# Patient Record
Sex: MALE | Race: BLACK OR AFRICAN AMERICAN | Employment: OTHER | URBAN - METROPOLITAN AREA
[De-identification: names, ages, dates, MRNs, and addresses within clinical notes are randomized per-mention and may not be internally consistent; named-entity substitution may affect disease eponyms.]

---

## 2019-06-18 ENCOUNTER — APPOINTMENT (OUTPATIENT)
Dept: GENERAL RADIOLOGY | Age: 59
DRG: 064 | End: 2019-06-18
Attending: EMERGENCY MEDICINE
Payer: OTHER GOVERNMENT

## 2019-06-18 ENCOUNTER — APPOINTMENT (OUTPATIENT)
Dept: CT IMAGING | Age: 59
DRG: 064 | End: 2019-06-18
Attending: EMERGENCY MEDICINE
Payer: OTHER GOVERNMENT

## 2019-06-18 ENCOUNTER — APPOINTMENT (OUTPATIENT)
Dept: GENERAL RADIOLOGY | Age: 59
DRG: 064 | End: 2019-06-18
Attending: HOSPITALIST
Payer: OTHER GOVERNMENT

## 2019-06-18 ENCOUNTER — APPOINTMENT (OUTPATIENT)
Dept: MRI IMAGING | Age: 59
DRG: 064 | End: 2019-06-18
Attending: HOSPITALIST
Payer: OTHER GOVERNMENT

## 2019-06-18 ENCOUNTER — HOSPITAL ENCOUNTER (INPATIENT)
Age: 59
LOS: 3 days | Discharge: HOME HEALTH | DRG: 064 | End: 2019-06-21
Attending: EMERGENCY MEDICINE | Admitting: HOSPITALIST
Payer: OTHER GOVERNMENT

## 2019-06-18 DIAGNOSIS — N17.9 AKI (ACUTE KIDNEY INJURY) (HCC): ICD-10-CM

## 2019-06-18 DIAGNOSIS — R41.0 CONFUSION: ICD-10-CM

## 2019-06-18 DIAGNOSIS — R41.0 DISORIENTATION: Primary | ICD-10-CM

## 2019-06-18 PROBLEM — G92.8 TOXIC METABOLIC ENCEPHALOPATHY: Status: ACTIVE | Noted: 2019-06-18

## 2019-06-18 PROBLEM — F33.9 RECURRENT MAJOR DEPRESSIVE DISORDER (HCC): Status: ACTIVE | Noted: 2019-06-18

## 2019-06-18 PROBLEM — R41.82 ALTERED MENTAL STATUS: Status: ACTIVE | Noted: 2019-06-18

## 2019-06-18 PROBLEM — F32.A DEPRESSION: Status: ACTIVE | Noted: 2019-06-18

## 2019-06-18 PROBLEM — I63.9 ACUTE CVA (CEREBROVASCULAR ACCIDENT) (HCC): Status: ACTIVE | Noted: 2019-06-18

## 2019-06-18 LAB
ALBUMIN SERPL-MCNC: 3.4 G/DL (ref 3.4–5)
ALBUMIN/GLOB SERPL: 0.8 {RATIO} (ref 0.8–1.7)
ALP SERPL-CCNC: 92 U/L (ref 45–117)
ALT SERPL-CCNC: 38 U/L (ref 16–61)
AMORPH CRY URNS QL MICRO: ABNORMAL
AMPHET UR QL SCN: NEGATIVE
ANION GAP SERPL CALC-SCNC: 7 MMOL/L (ref 3–18)
ANION GAP SERPL CALC-SCNC: 9 MMOL/L (ref 3–18)
APAP SERPL-MCNC: <2 UG/ML (ref 10–30)
APPEARANCE UR: ABNORMAL
AST SERPL-CCNC: 31 U/L (ref 15–37)
ATRIAL RATE: 84 BPM
BACTERIA URNS QL MICRO: ABNORMAL /HPF
BARBITURATES UR QL SCN: NEGATIVE
BASOPHILS # BLD: 0 K/UL (ref 0–0.1)
BASOPHILS NFR BLD: 0 % (ref 0–2)
BENZODIAZ UR QL: NEGATIVE
BILIRUB SERPL-MCNC: 0.3 MG/DL (ref 0.2–1)
BILIRUB UR QL: NEGATIVE
BNP SERPL-MCNC: 49 PG/ML (ref 0–900)
BUN SERPL-MCNC: 26 MG/DL (ref 7–18)
BUN SERPL-MCNC: 30 MG/DL (ref 7–18)
BUN/CREAT SERPL: 11 (ref 12–20)
BUN/CREAT SERPL: 13 (ref 12–20)
CALCIUM SERPL-MCNC: 8.4 MG/DL (ref 8.5–10.1)
CALCIUM SERPL-MCNC: 9 MG/DL (ref 8.5–10.1)
CALCULATED P AXIS, ECG09: 56 DEGREES
CALCULATED R AXIS, ECG10: 46 DEGREES
CALCULATED T AXIS, ECG11: 36 DEGREES
CANNABINOIDS UR QL SCN: POSITIVE
CHLORIDE SERPL-SCNC: 104 MMOL/L (ref 100–108)
CHLORIDE SERPL-SCNC: 108 MMOL/L (ref 100–108)
CO2 SERPL-SCNC: 23 MMOL/L (ref 21–32)
CO2 SERPL-SCNC: 25 MMOL/L (ref 21–32)
COCAINE UR QL SCN: NEGATIVE
COLOR UR: ABNORMAL
CREAT SERPL-MCNC: 1.94 MG/DL (ref 0.6–1.3)
CREAT SERPL-MCNC: 2.82 MG/DL (ref 0.6–1.3)
DIAGNOSIS, 93000: NORMAL
DIFFERENTIAL METHOD BLD: ABNORMAL
EOSINOPHIL # BLD: 0 K/UL (ref 0–0.4)
EOSINOPHIL NFR BLD: 0 % (ref 0–5)
EPITH CASTS URNS QL MICRO: ABNORMAL /LPF (ref 0–5)
ERYTHROCYTE [DISTWIDTH] IN BLOOD BY AUTOMATED COUNT: 15 % (ref 11.6–14.5)
ETHANOL SERPL-MCNC: <3 MG/DL (ref 0–3)
GLOBULIN SER CALC-MCNC: 4.4 G/DL (ref 2–4)
GLUCOSE BLD STRIP.AUTO-MCNC: 134 MG/DL (ref 70–110)
GLUCOSE SERPL-MCNC: 115 MG/DL (ref 74–99)
GLUCOSE SERPL-MCNC: 142 MG/DL (ref 74–99)
GLUCOSE UR STRIP.AUTO-MCNC: NEGATIVE MG/DL
HCT VFR BLD AUTO: 42.5 % (ref 36–48)
HDSCOM,HDSCOM: ABNORMAL
HGB BLD-MCNC: 14 G/DL (ref 13–16)
HGB UR QL STRIP: ABNORMAL
KETONES UR QL STRIP.AUTO: ABNORMAL MG/DL
LEUKOCYTE ESTERASE UR QL STRIP.AUTO: ABNORMAL
LYMPHOCYTES # BLD: 1.5 K/UL (ref 0.9–3.6)
LYMPHOCYTES NFR BLD: 12 % (ref 21–52)
MAGNESIUM SERPL-MCNC: 2.4 MG/DL (ref 1.6–2.6)
MCH RBC QN AUTO: 29.2 PG (ref 24–34)
MCHC RBC AUTO-ENTMCNC: 32.9 G/DL (ref 31–37)
MCV RBC AUTO: 88.7 FL (ref 74–97)
METHADONE UR QL: NEGATIVE
MONOCYTES # BLD: 1.2 K/UL (ref 0.05–1.2)
MONOCYTES NFR BLD: 10 % (ref 3–10)
MUCOUS THREADS URNS QL MICRO: ABNORMAL /LPF
NEUTS SEG # BLD: 9.6 K/UL (ref 1.8–8)
NEUTS SEG NFR BLD: 78 % (ref 40–73)
NITRITE UR QL STRIP.AUTO: NEGATIVE
OPIATES UR QL: NEGATIVE
P-R INTERVAL, ECG05: 140 MS
PCP UR QL: NEGATIVE
PH UR STRIP: 5 [PH] (ref 5–8)
PLATELET # BLD AUTO: 220 K/UL (ref 135–420)
PMV BLD AUTO: 9.1 FL (ref 9.2–11.8)
POTASSIUM SERPL-SCNC: 4.3 MMOL/L (ref 3.5–5.5)
POTASSIUM SERPL-SCNC: 4.6 MMOL/L (ref 3.5–5.5)
PROT SERPL-MCNC: 7.8 G/DL (ref 6.4–8.2)
PROT UR STRIP-MCNC: 300 MG/DL
Q-T INTERVAL, ECG07: 374 MS
QRS DURATION, ECG06: 96 MS
QTC CALCULATION (BEZET), ECG08: 441 MS
RBC # BLD AUTO: 4.79 M/UL (ref 4.7–5.5)
RBC #/AREA URNS HPF: ABNORMAL /HPF (ref 0–5)
SALICYLATES SERPL-MCNC: <1.7 MG/DL (ref 2.8–20)
SODIUM SERPL-SCNC: 136 MMOL/L (ref 136–145)
SODIUM SERPL-SCNC: 140 MMOL/L (ref 136–145)
SP GR UR REFRACTOMETRY: 1.02 (ref 1–1.03)
TROPONIN I BLD-MCNC: <0.04 NG/ML (ref 0–0.08)
UROBILINOGEN UR QL STRIP.AUTO: 1 EU/DL (ref 0.2–1)
VENTRICULAR RATE, ECG03: 84 BPM
WBC # BLD AUTO: 12.3 K/UL (ref 4.6–13.2)
WBC URNS QL MICRO: ABNORMAL /HPF (ref 0–5)

## 2019-06-18 PROCEDURE — 87086 URINE CULTURE/COLONY COUNT: CPT

## 2019-06-18 PROCEDURE — 81001 URINALYSIS AUTO W/SCOPE: CPT

## 2019-06-18 PROCEDURE — 80053 COMPREHEN METABOLIC PANEL: CPT

## 2019-06-18 PROCEDURE — 83880 ASSAY OF NATRIURETIC PEPTIDE: CPT

## 2019-06-18 PROCEDURE — 74011000258 HC RX REV CODE- 258: Performed by: HOSPITALIST

## 2019-06-18 PROCEDURE — 93005 ELECTROCARDIOGRAM TRACING: CPT

## 2019-06-18 PROCEDURE — 74011000250 HC RX REV CODE- 250: Performed by: HOSPITALIST

## 2019-06-18 PROCEDURE — 70551 MRI BRAIN STEM W/O DYE: CPT

## 2019-06-18 PROCEDURE — 99285 EMERGENCY DEPT VISIT HI MDM: CPT

## 2019-06-18 PROCEDURE — 74011250636 HC RX REV CODE- 250/636: Performed by: EMERGENCY MEDICINE

## 2019-06-18 PROCEDURE — 36415 COLL VENOUS BLD VENIPUNCTURE: CPT

## 2019-06-18 PROCEDURE — 74018 RADEX ABDOMEN 1 VIEW: CPT

## 2019-06-18 PROCEDURE — 51798 US URINE CAPACITY MEASURE: CPT

## 2019-06-18 PROCEDURE — 51701 INSERT BLADDER CATHETER: CPT

## 2019-06-18 PROCEDURE — 82962 GLUCOSE BLOOD TEST: CPT

## 2019-06-18 PROCEDURE — 80307 DRUG TEST PRSMV CHEM ANLYZR: CPT

## 2019-06-18 PROCEDURE — 83735 ASSAY OF MAGNESIUM: CPT

## 2019-06-18 PROCEDURE — 96374 THER/PROPH/DIAG INJ IV PUSH: CPT

## 2019-06-18 PROCEDURE — 65660000000 HC RM CCU STEPDOWN

## 2019-06-18 PROCEDURE — 71045 X-RAY EXAM CHEST 1 VIEW: CPT

## 2019-06-18 PROCEDURE — 85025 COMPLETE CBC W/AUTO DIFF WBC: CPT

## 2019-06-18 PROCEDURE — 84484 ASSAY OF TROPONIN QUANT: CPT

## 2019-06-18 PROCEDURE — 96361 HYDRATE IV INFUSION ADD-ON: CPT

## 2019-06-18 PROCEDURE — 77030005563 HC CATH URETH INT MMGH -A

## 2019-06-18 PROCEDURE — 70544 MR ANGIOGRAPHY HEAD W/O DYE: CPT

## 2019-06-18 PROCEDURE — 70450 CT HEAD/BRAIN W/O DYE: CPT

## 2019-06-18 PROCEDURE — 74011250636 HC RX REV CODE- 250/636: Performed by: HOSPITALIST

## 2019-06-18 RX ORDER — ASPIRIN 325 MG
325 TABLET ORAL DAILY
Status: DISCONTINUED | OUTPATIENT
Start: 2019-06-18 | End: 2019-06-19

## 2019-06-18 RX ORDER — GABAPENTIN 100 MG/1
400 CAPSULE ORAL 4 TIMES DAILY
Status: ON HOLD | COMMUNITY
End: 2019-06-21 | Stop reason: SDUPTHER

## 2019-06-18 RX ORDER — DULOXETIN HYDROCHLORIDE 20 MG/1
30 CAPSULE, DELAYED RELEASE ORAL 3 TIMES DAILY
COMMUNITY

## 2019-06-18 RX ORDER — SODIUM CHLORIDE AND POTASSIUM CHLORIDE .9; .15 G/100ML; G/100ML
SOLUTION INTRAVENOUS CONTINUOUS
Status: DISCONTINUED | OUTPATIENT
Start: 2019-06-18 | End: 2019-06-20

## 2019-06-18 RX ORDER — QUETIAPINE FUMARATE 100 MG/1
400 TABLET, FILM COATED ORAL
COMMUNITY

## 2019-06-18 RX ORDER — NALOXONE HYDROCHLORIDE 1 MG/ML
1 INJECTION INTRAMUSCULAR; INTRAVENOUS; SUBCUTANEOUS
Status: COMPLETED | OUTPATIENT
Start: 2019-06-18 | End: 2019-06-18

## 2019-06-18 RX ORDER — SODIUM CHLORIDE 0.9 % (FLUSH) 0.9 %
5-40 SYRINGE (ML) INJECTION EVERY 8 HOURS
Status: DISCONTINUED | OUTPATIENT
Start: 2019-06-18 | End: 2019-06-21 | Stop reason: HOSPADM

## 2019-06-18 RX ORDER — SODIUM CHLORIDE 0.9 % (FLUSH) 0.9 %
5-40 SYRINGE (ML) INJECTION AS NEEDED
Status: DISCONTINUED | OUTPATIENT
Start: 2019-06-18 | End: 2019-06-21 | Stop reason: HOSPADM

## 2019-06-18 RX ORDER — ASPIRIN 325 MG
TABLET ORAL
Status: DISPENSED
Start: 2019-06-18 | End: 2019-06-19

## 2019-06-18 RX ORDER — HEPARIN SODIUM 5000 [USP'U]/ML
5000 INJECTION, SOLUTION INTRAVENOUS; SUBCUTANEOUS EVERY 8 HOURS
Status: DISCONTINUED | OUTPATIENT
Start: 2019-06-18 | End: 2019-06-21 | Stop reason: HOSPADM

## 2019-06-18 RX ADMIN — HEPARIN SODIUM 5000 UNITS: 5000 INJECTION INTRAVENOUS; SUBCUTANEOUS at 18:22

## 2019-06-18 RX ADMIN — DOXYCYCLINE 100 MG: 100 INJECTION, POWDER, LYOPHILIZED, FOR SOLUTION INTRAVENOUS at 11:05

## 2019-06-18 RX ADMIN — NALOXONE HYDROCHLORIDE 1 MG: 1 INJECTION PARENTERAL at 04:53

## 2019-06-18 RX ADMIN — FOLIC ACID: 5 INJECTION, SOLUTION INTRAMUSCULAR; INTRAVENOUS; SUBCUTANEOUS at 12:27

## 2019-06-18 RX ADMIN — SODIUM CHLORIDE 1000 ML: 900 INJECTION, SOLUTION INTRAVENOUS at 07:23

## 2019-06-18 RX ADMIN — SODIUM CHLORIDE AND POTASSIUM CHLORIDE 125 ML/HR: 9; 1.49 INJECTION, SOLUTION INTRAVENOUS at 07:53

## 2019-06-18 RX ADMIN — SODIUM CHLORIDE 500 ML: 900 INJECTION, SOLUTION INTRAVENOUS at 05:34

## 2019-06-18 RX ADMIN — HEPARIN SODIUM 5000 UNITS: 5000 INJECTION INTRAVENOUS; SUBCUTANEOUS at 11:16

## 2019-06-18 RX ADMIN — CEFTRIAXONE 1 G: 1 INJECTION, POWDER, FOR SOLUTION INTRAMUSCULAR; INTRAVENOUS at 11:05

## 2019-06-18 NOTE — PROGRESS NOTES
Problem: Falls - Risk of  Goal: *Absence of Falls  Description  Document Jennifer Sol Fall Risk and appropriate interventions in the flowsheet.   Outcome: Progressing Towards Goal     Problem: Patient Education: Go to Patient Education Activity  Goal: Patient/Family Education  Outcome: Progressing Towards Goal

## 2019-06-18 NOTE — PROGRESS NOTES
Hospitalist Progress Note    Patient: Gisell Mcarthur MRN: [id number]  CSN: [id number]    YOB: 1960  Age: 66 y.o. Sex: male    DOA: 6/18/2019 LOS:  LOS: 0 days        Met with family earlier this afternoon, and Mr Lety Bain  He noted left hand grasp weakness and noted it was not normal for him  Otherwise he was now awake, alert, oriented and feeling well-looking much better than earlier in ER    MRI brain ordered and performed-called by radiologist to note there are bilateral embolic appearing infarctions in the brain, largest of which in cerebellum    Consulted with neurology-ordered aspirin, he has declined it as \"allergic\", he has \"rash with zocor\", so hesitant on statin administration.  I ordered stroke protocol and STAT MRA head to see if basilar artery occlusion-if this is positive  he will need transfer to [facility]    Discussed all results with family    Patient notes he has been without his CPAP for 5 months as ex-wife has held his property and they are in court trying to work out things-he was told he could become very ill if he did not have his CPAP apparently per his PCP    Have ordered CPAP for him here for PRN and QHS

## 2019-06-18 NOTE — ED NOTES
Pt unable to provide urine sample, attempt to cath pt for urine unsuccessful, pt to attempt to provide urine sample again. Pt resting on stretcher, call bell in reach and bed in low position. Son at bedside. Will continue to monitor.

## 2019-06-18 NOTE — ROUTINE PROCESS
TRANSFER - IN REPORT: 
 
Verbal report received from CLARI Bolanos R.N.(name) on NAWAF Pfeiffer  being received from ED(unit) for routine progression of care Report consisted of patients Situation, Background, Assessment and  
Recommendations(SBAR). Information from the following report(s) SBAR, Kardex, Intake/Output, MAR, Accordion, Recent Results and Med Rec Status was reviewed with the receiving nurse. Opportunity for questions and clarification was provided. Assessment completed upon patients arrival to unit and care assumed.

## 2019-06-18 NOTE — ED PROVIDER NOTES
EMERGENCY DEPARTMENT HISTORY AND PHYSICAL EXAM    Date: 6/18/2019  Patient Name: Jann Flores    History of Presenting Illness     Chief Complaint   Patient presents with    Hypotension         History Provided By: Patient and EMS    Additional History (Context):   Jann Flores is a 66 y.o. male with PMHX hypertension, diabetes presents to the emergency department via EMS after calling 911 stating that \"I cannot move\". ,  Patient denying any states that he only took his gabapentin and high blood pressure medication as prescribed. Patient denies any alcohol or drug use. EMS reports that the patient had a episode of bowel incontinence. Pt denies abdominal pain, vomiting, fever, chills, shortness of breath, and any other sxs or complaints. Denies any intentional overdose. Denies any SI or HI.    PCP: Memo Mcgraw MD    Current Facility-Administered Medications   Medication Dose Route Frequency Provider Last Rate Last Dose    sodium chloride 0.9 % bolus infusion 1,000 mL  1,000 mL IntraVENous ONCE Mehdi Govea, DO 1,000 mL/hr at 06/18/19 0723 1,000 mL at 06/18/19 0723    0.9% sodium chloride with KCl 20 mEq/L infusion   IntraVENous CONTINUOUS Ruddy Lee MD        0.9% sodium chloride 1,000 mL with mvi, adult no. 4 with vit K 10 mL, thiamine 548 mg, folic acid 1 mg infusion   IntraVENous Q24H Ruddy Lee MD        heparin (porcine) injection 5,000 Units  5,000 Units SubCUTAneous Q8H Ruddy Lee MD         Current Outpatient Medications   Medication Sig Dispense Refill    gabapentin (NEURONTIN) 100 mg capsule Take  by mouth three (3) times daily.  Cetirizine (ZYRTEC) 10 mg cap Take  by mouth.  QUEtiapine (SEROQUEL) 100 mg tablet Take 50 mg by mouth two (2) times a day.  DULoxetine (CYMBALTA) 20 mg capsule Take 20 mg by mouth daily.          Past History     Past Medical History:  Past Medical History:   Diagnosis Date    DJD (degenerative joint disease)     Hypertension     Neuropathy        Past Surgical History:  History reviewed. No pertinent surgical history. Family History:  History reviewed. No pertinent family history. Social History:  Social History     Tobacco Use    Smoking status: Never Smoker    Smokeless tobacco: Never Used   Substance Use Topics    Alcohol use: Not on file    Drug use: Never       Allergies: Allergies   Allergen Reactions    Simvastatin Rash         Review of Systems   Review of Systems   Constitutional: Negative for chills and fever. HENT: Negative for congestion, ear pain, sinus pain and sore throat. Eyes: Negative for pain and visual disturbance. Respiratory: Negative for cough and shortness of breath. Cardiovascular: Negative for chest pain and leg swelling. Gastrointestinal: Negative for abdominal pain, constipation, diarrhea, nausea and vomiting. Genitourinary: Negative for dysuria and hematuria. Musculoskeletal: Negative for back pain and neck pain. Skin: Negative for pallor and rash. Neurological: Negative for dizziness, tremors, weakness, light-headedness and headaches. All other systems reviewed and are negative.       Physical Exam     Vitals:    06/18/19 0459 06/18/19 0500 06/18/19 0508 06/18/19 0600   BP:  (!) 121/98  104/52   Pulse:  87  85   Resp:  17  16   Temp:       SpO2: 96%  96% 98%   Weight:       Height:         Physical Exam    Nursing note and vitals reviewed    Constitutional: Middle-aged -American male, no acute distress  Head: Normocephalic, Atraumatic  Eyes: Pinpoint pupils, EOMI  Neck: Supple, non-tender  Cardiovascular: Regular rate and rhythm, no murmurs, rubs, or gallops  Chest: Normal work of breathing and chest excursion bilaterally  Lungs: Clear to ausculation bilaterally, no wheezes, no rhonchi  Abdomen: Soft, non tender, non distended, normoactive bowel sounds  Back: No evidence of trauma or deformity  Extremities: No evidence of trauma or deformity, no LE edema  Skin: Warm and dry, normal cap refill  Neuro: Alert and appropriate, CN intact, normal speech, moving all 4 extremities freely and symmetrically  Psychiatric: Normal mood and affect       Diagnostic Study Results     Labs -     Recent Results (from the past 12 hour(s))   CBC WITH AUTOMATED DIFF    Collection Time: 06/18/19  4:50 AM   Result Value Ref Range    WBC 12.3 4.6 - 13.2 K/uL    RBC 4.79 4.70 - 5.50 M/uL    HGB 14.0 13.0 - 16.0 g/dL    HCT 42.5 36.0 - 48.0 %    MCV 88.7 74.0 - 97.0 FL    MCH 29.2 24.0 - 34.0 PG    MCHC 32.9 31.0 - 37.0 g/dL    RDW 15.0 (H) 11.6 - 14.5 %    PLATELET 541 083 - 264 K/uL    MPV 9.1 (L) 9.2 - 11.8 FL    NEUTROPHILS 78 (H) 40 - 73 %    LYMPHOCYTES 12 (L) 21 - 52 %    MONOCYTES 10 3 - 10 %    EOSINOPHILS 0 0 - 5 %    BASOPHILS 0 0 - 2 %    ABS. NEUTROPHILS 9.6 (H) 1.8 - 8.0 K/UL    ABS. LYMPHOCYTES 1.5 0.9 - 3.6 K/UL    ABS. MONOCYTES 1.2 0.05 - 1.2 K/UL    ABS. EOSINOPHILS 0.0 0.0 - 0.4 K/UL    ABS. BASOPHILS 0.0 0.0 - 0.1 K/UL    DF AUTOMATED     METABOLIC PANEL, COMPREHENSIVE    Collection Time: 06/18/19  4:50 AM   Result Value Ref Range    Sodium 136 136 - 145 mmol/L    Potassium 4.6 3.5 - 5.5 mmol/L    Chloride 104 100 - 108 mmol/L    CO2 25 21 - 32 mmol/L    Anion gap 7 3.0 - 18 mmol/L    Glucose 142 (H) 74 - 99 mg/dL    BUN 30 (H) 7.0 - 18 MG/DL    Creatinine 2.82 (H) 0.6 - 1.3 MG/DL    BUN/Creatinine ratio 11 (L) 12 - 20      GFR est AA 28 (L) >60 ml/min/1.73m2    GFR est non-AA 23 (L) >60 ml/min/1.73m2    Calcium 9.0 8.5 - 10.1 MG/DL    Bilirubin, total 0.3 0.2 - 1.0 MG/DL    ALT (SGPT) 38 16 - 61 U/L    AST (SGOT) 31 15 - 37 U/L    Alk.  phosphatase 92 45 - 117 U/L    Protein, total 7.8 6.4 - 8.2 g/dL    Albumin 3.4 3.4 - 5.0 g/dL    Globulin 4.4 (H) 2.0 - 4.0 g/dL    A-G Ratio 0.8 0.8 - 1.7     NT-PRO BNP    Collection Time: 06/18/19  4:50 AM   Result Value Ref Range    NT pro-BNP 49 0 - 900 PG/ML   MAGNESIUM    Collection Time: 06/18/19  4:50 AM   Result Value Ref Range    Magnesium 2.4 1.6 - 2.6 mg/dL   SALICYLATE    Collection Time: 06/18/19  4:50 AM   Result Value Ref Range    Salicylate level <4.1 (L) 2.8 - 20.0 MG/DL   ACETAMINOPHEN    Collection Time: 06/18/19  4:50 AM   Result Value Ref Range    Acetaminophen level <2 (L) 10.0 - 30.0 ug/mL   ETHYL ALCOHOL    Collection Time: 06/18/19  4:50 AM   Result Value Ref Range    ALCOHOL(ETHYL),SERUM <3 0 - 3 MG/DL   GLUCOSE, POC    Collection Time: 06/18/19  4:55 AM   Result Value Ref Range    Glucose (POC) 134 (H) 70 - 110 mg/dL   EKG, 12 LEAD, INITIAL    Collection Time: 06/18/19  4:59 AM   Result Value Ref Range    Ventricular Rate 84 BPM    Atrial Rate 84 BPM    P-R Interval 140 ms    QRS Duration 96 ms    Q-T Interval 374 ms    QTC Calculation (Bezet) 441 ms    Calculated P Axis 56 degrees    Calculated R Axis 46 degrees    Calculated T Axis 36 degrees    Diagnosis       Normal sinus rhythm  Normal ECG  No previous ECGs available     POC TROPONIN-I    Collection Time: 06/18/19  6:07 AM   Result Value Ref Range    Troponin-I (POC) <0.04 0.00 - 0.08 ng/mL       Radiologic Studies -   CT HEAD WO CONT   Final Result   IMPRESSION:      1. No acute intracranial abnormalities are identified. No CT evidence to   suggest acute intracranial hematoma, cortical infarct, or mass effect/mass   lesion. 2. Paranasal sinus inflammatory changes. XR CHEST PORT    (Results Pending)   RADIOLOGY FINDINGS  Chest x-ray shows no acute process  Pending review by Radiologist  Recorded by Griselda Govea DO      CT Results  (Last 48 hours)               06/18/19 0643  CT HEAD WO CONT Final result    Impression:  IMPRESSION:       1. No acute intracranial abnormalities are identified. No CT evidence to   suggest acute intracranial hematoma, cortical infarct, or mass effect/mass   lesion. 2. Paranasal sinus inflammatory changes.            Narrative:  EXAM: CT head       CLINICAL INDICATION/HISTORY: Altered level of consciousness, confusion. COMPARISON: None. TECHNIQUE: Axial CT imaging of the head  was obtained from skull base to vertex   without intravenous contrast. Coronal and sagittal reconstructions were   obtained. One or more dose reduction techniques were used on this CT: automated   exposure control, adjustment of the mAs and/or kVp according to patient's size,   and iterative reconstruction techniques. The specific techniques utilized on   this CT exam have been documented in the patient's electronic medical record. Digital imaging and communications and medicine (DICOM) format image data are   available to nonaffiliated external healthcare facilities or entities on a   secure, media free, reciprocally searchable basis with patient authorization for   at least a 12 month period after this study. _______________       FINDINGS:       BRAIN AND POSTERIOR FOSSA: The sulci, folia, ventricles and basal cisterns are   within normal limits for the patient?s age. There is no intracranial hemorrhage,   mass effect, or shift of midline structures. There are no areas of abnormal   parenchymal attenuation. EXTRA-AXIAL SPACES AND MENINGES: There are no abnormal extra-axial fluid   collections. CALVARIUM: No acute osseous abnormality. SINUSES: Paranasal sinus partial opacification and mucosal thickening. Mastoids   clear. OTHER: None.       _______________               CXR Results  (Last 48 hours)    None            Medical Decision Making   I am the first provider for this patient. I reviewed the vital signs, available nursing notes, past medical history, past surgical history, family history and social history. Vital Signs-Reviewed the patient's vital signs. Pulse Oximetry Analysis -96 % on room air    Cardiac Monitor:  Rate: 87 bpm  Rhythm: Regular    EKG interpretation: (Preliminary)  5:05 AM  Normal sinus rhythm 84 bpm.  QTc 441 ms.   No acute ST elevation    Records Reviewed: Nursing Notes and Old Medical Records    Provider Notes:   66 y.o. male with a history of hypertension presenting after calling 911 saying \"I cannot move\". On exam patient is afebrile with appropriate vital signs. Pinpoint pupils. Moving all 4 extremities symmetrically and freely. No focal neurological deficits. One history of bowel incontinence. Work-up will be broad and evaluate for metabolic derangements. Will also obtain UDS, check salicylate, Tylenol alcohol level. Although ACS very low on my differential, will obtain EKG and cardiac enzymes. Also obtain a CT scan of the head. Will trial Narcan and reassess. Procedures:  Procedures    ED Course:   4:46 AM   Initial assessment performed. The patients presenting problems have been discussed, and they are in agreement with the care plan formulated and outlined with them. I have encouraged them to ask questions as they arise throughout their visit. 7:12 AM  Patient's labs significant with an elevated BUN and creatinine of 2.8. Creatinine in 2018 within normal limits of 1. Attempted to straight cath the patient for urinalysis twice, however unable to obtain urine. UDS still pending from urine. Patient's lack of urine, elevated creatinine, concern for RODRIGUEZ. CT scan showing no acute process. Diagnosis and Disposition     7:12 AM  I have spent 40 minutes of critical care time involved in lab review, consultations with specialist, family decision-making, and documentation. During this entire length of time I was immediately available to the patient. Critical Care: The reason for providing this level of medical care for this critically ill patient was due a critical illness that impaired one or more vital organ systems such that there was a high probability of imminent or life threatening deterioration in the patients condition.  This care involved high complexity decision making to assess, manipulate, and support vital system functions, to treat this degreee vital organ system failure and to prevent further life threatening deterioration of the patients condition. Core Measures:  For Hospitalized Patients:    1. Hospitalization Decision Time:  The decision to hospitalize the patient was made by Eugenia Castrejon DO at 7:28 AM on 6/18/2019    2. Aspirin: Aspirin was not given because the patient did not present with a stroke at the time of their Emergency Department evaluation    7:12 AM  Patient is being admitted to the hospital by Tree Rubio MD. The results of their tests and reasons for their admission have been discussed with them and/or available family. They convey agreement and understanding for the need to be admitted and for their admission diagnosis. CONDITIONS ON ADMISSION:  Sepsis is not present at the time of admission. Pneumonia is not present at the time of admission. MRSA is not present at the time of admission. Wound infection is not present at the time of admission. Pressure Ulcer is not present at the time of admission. CLINICAL IMPRESSION:    1. Disorientation    2. Confusion    3. [facility]UEZ (acute kidney injury) ([facility]      ____________________________________     Please note that this dictation was completed with [employer] the computer voice recognition software. Quite often unanticipated grammatical, syntax, homophones, and other interpretive errors are inadvertently transcribed by the computer software. Please disregard these errors. Please excuse any errors that have escaped final proofreading.

## 2019-06-18 NOTE — H&P
[facility] [place]  HISTORY AND PHYSICAL    Name:  Liana Perrin  MR#:   [id number]  :  1960  ACCOUNT #:  [de-identified]  ADMIT DATE:  2019      ADMITTING DIAGNOSES:  1. Metabolic toxic encephalopathy. 2.  Depression. 3.  Chronic insomnia. 4.  Urinary tract infection. 5.  Acute kidney injury. HISTORY OF PRESENT ILLNESS:  This is a 56-year-old [place] American gentleman who was found on the couch by his son this morning stating he could not move. He had had stool incontinence. He was very encephalopathic and confused. EMS was called. They brought him into the emergency room where he denied any intentional overdose. Was very lethargic initially and unable to explain what had actually happened to him, but as he received some fluids and started to wake up more, he noted that he had taken 1200 mg of gabapentin yesterday which I believe is his usual dose and also took Seroquel. He states he was trying to go to sleep last night as he suffers with chronic insomnia and had taken his medication together. I am not sure at this point if that is a regular occurrence for him or not, but anyways he denies any alcohol or illicit drugs. We have been unable to obtain a UDS because of no urine in his bladder at this point; we are awaiting for him to urinate. He denies any suicidal or homicidal intention. I do not know his PCP just yet. He still awakening and able to answer most questions but not all. CURRENT OUTPATIENT MEDICATIONS:  1.  Neurontin. 2.  Zyrtec. 3.  Seroquel. 4.  Cymbalta. PAST MEDICAL HISTORY:  1. Depression. 2.  Degenerative joint disease. 3.  Neuropathy. 4.  High blood pressure. PAST SURGICAL HISTORY:  None. FAMILY HISTORY:  He cannot tell me right now. SOCIAL HISTORY:  Does not smoke cigarettes. Does not drink alcohol regularly. Denies illicit drugs. Lives with his son. ALLERGIES:  HAS AN ALLERGY TO SIMVASTATIN.     REVIEW OF SYSTEMS:  CONSTITUTIONAL: He is denying any chills or fevers. RESPIRATORY:  He denies coughing, shortness of breath, or upper respiratory infection symptoms. CARDIOVASCULAR:  No chest pain or leg swelling. GI:  He denies any nausea or vomiting. He has had no recent diarrhea. Also he had stool incontinence this morning. GENITOURINARY:  No dysuria or hematuria recently. No abdominal pain. NEUROLOGIC:  He denies any current dizziness. He feels very weak and tired currently. No headaches. PHYSICAL EXAMINATION:  GENERAL:  He is lethargic but he will arouse and he answers questions. He was able to sit up in the bed without me telling him to do so. He is oriented to person, place and time currently. VITAL SIGNS:  Blood pressure is 132/90, pulse 81, temperature 97.1, respiratory rate 17, SaO2 is 97% on room air. HEENT:  Sclerae anicteric. Conjunctivae pale. Oropharynx is dry. No lesions. NECK:  Supple. No thyromegaly or lymphadenopathy. LUNGS:  Clear bilaterally. No rales, rhonchi, or wheezes. CARDIAC:  Regular rate and rhythm. No murmur, rub, or gallop. ABDOMEN:  Soft, nontender, no distention. No ascites. Normal bowel sounds. LOWER EXTREMITIES:  No clubbing, cyanosis, or edema. NEUROLOGIC:  Moves all four extremities with purpose. No asterixis noted. No cranial nerve deficit noted on exam.    LABORATORY DATA:  Labs show a troponin that is normal.  Glucose 134. Urinalysis was positive for moderate blood, small leukocyte esterase, 15-20 wbc's, 2+ bacteria. Metabolic panel showed BUN of 30, creatinine 2.82. LFTs were unremarkable. BNP was normal at 49. Acetaminophen level less than 2. Salicylate level less than 1.7. They did finally obtain a UDS, it is positive for THC. Methadone is negative. CT of the head showed no acute intracranial abnormalities, and he had an EKG that showed normal sinus rhythm. Chest x-ray shows no acute cardiopulmonary process.   White count is 12.3, H and H 14 and 42.5, platelets of 220.    ASSESSMENT:  1. Toxic metabolic encephalopathy, likely due to medications, dehydration, and acute kidney injury. I do not know his baseline. Creatinine also if I look at labs from 09/2018, his creatinine then was 1.1.  2.  Urinary tract infection. 3.  Depression and insomnia. PLAN:  Continue hydration. Start empiric antibiotics. Follow up a urine culture result; wants to get more urine from him. Monitor him on telemetry. Hopefully he will wake up more so. We are going to give him a banana bag in the interim as well. Heparin for DVT prophylaxis. Follow up a repeat BMP, CBC tomorrow morning. Check a B12 and folate. Hold all his home medications at this point in time and expect at least 24-48 hours in the hospital for these acute issues.       MD TWYLA [email or web address]  D:  06/18/2019 11:04  T:  06/18/2019 11:09  JOB #:  [id number]

## 2019-06-18 NOTE — PROGRESS NOTES
RADIOLOGY PROGRESS NOTE    Preliminary interpretation MRI brain 6/18/19:   > Multiple bilateral acute infarcts in the distribution of bilateral posterior circulation and bilateral MCA. Largest infarct in the posterior left cerebellum. Findings consistent with embolic infarcts.   > No hemorrhage. Final interpretation by neuroradiologist in am.    D/W Dr. Pete Smith.     Altaf Valadez MD  Vascular & Interventional Radiology  [facility]  6/18/2019

## 2019-06-18 NOTE — ED NOTES
Pt attempted to provide urine sample, unable to at this time.  Pt and family requesting for pt to try occasionally rather than attempt straight cath again, provider aware

## 2019-06-18 NOTE — ED NOTES
TRANSFER - OUT REPORT:    Verbal report given to Gabriella Heart RN on Gricelda Aceves  being transferred to [facility] for routine progression of care       Report consisted of patients Situation, Background, Assessment and   Recommendations(SBAR). Information from the following report(s) SBAR and Cardiac Rhythm Normal Sinus Rythmn  was reviewed with the receiving nurse. Lines:   Peripheral IV 06/18/19 Left Antecubital (Active)   Site Assessment Clean, dry, & intact 6/18/2019  4:52 AM   Phlebitis Assessment 0 6/18/2019  4:52 AM   Infiltration Assessment 0 6/18/2019  4:52 AM   Dressing Status Clean, dry, & intact 6/18/2019  4:52 AM   Dressing Type Transparent 6/18/2019  4:52 AM   Hub Color/Line Status Green 6/18/2019  4:52 AM   Action Taken Blood drawn 6/18/2019  4:52 AM       Saline Lock 06/18/19 Right Antecubital (Active)   Site Assessment Clean, dry, & intact 6/18/2019  4:50 AM   Phlebitis Assessment 0 6/18/2019  4:50 AM   Infiltration Assessment 0 6/18/2019  4:50 AM   Dressing Status Clean, dry, & intact 6/18/2019  4:50 AM   Dressing Type Transparent 6/18/2019  4:50 AM   Hub Color/Line Status Pink;Flushed;Patent 6/18/2019  4:50 AM   Action Taken Blood drawn 6/18/2019  4:50 AM        Opportunity for questions and clarification was provided.       Patient transported with:   Monitor  Registered Nurse

## 2019-06-18 NOTE — PROGRESS NOTES
Called floor for patient at 06:55 pm called floor again at 07:18 still waiting on patient  For Stat  MRA .

## 2019-06-18 NOTE — ED TRIAGE NOTES
Pt arrives via ems stretcher with c\o incontinence of bowel, per EMS pt was hypotensive on arrival, pt with pinpoint pupils and pt is drowsy but arousable to loud verbal stimuli, pt is able to make needs known speaking in complete sentences, pt in nad at this time

## 2019-06-18 NOTE — ED NOTES
Verbal shift change report given to Rachid Foster RN (oncoming nurse) by Marla Dougherty RN (offgoing nurse). Report included the following information SBAR, ED Summary and MAR.

## 2019-06-18 NOTE — PROGRESS NOTES
0900:Patient care received. Patient drowsy and oriented x 4 . Patient resting in bed. Patient stable. Call light with in reach bed in lowest position.

## 2019-06-19 ENCOUNTER — APPOINTMENT (OUTPATIENT)
Dept: NON INVASIVE DIAGNOSTICS | Age: 59
DRG: 064 | End: 2019-06-19
Attending: HOSPITALIST
Payer: OTHER GOVERNMENT

## 2019-06-19 ENCOUNTER — APPOINTMENT (OUTPATIENT)
Dept: VASCULAR SURGERY | Age: 59
DRG: 064 | End: 2019-06-19
Attending: HOSPITALIST
Payer: OTHER GOVERNMENT

## 2019-06-19 LAB
ANION GAP SERPL CALC-SCNC: 7 MMOL/L (ref 3–18)
BUN SERPL-MCNC: 22 MG/DL (ref 7–18)
BUN/CREAT SERPL: 15 (ref 12–20)
CALCIUM SERPL-MCNC: 8.1 MG/DL (ref 8.5–10.1)
CHLORIDE SERPL-SCNC: 111 MMOL/L (ref 100–108)
CHOLEST SERPL-MCNC: 212 MG/DL
CO2 SERPL-SCNC: 24 MMOL/L (ref 21–32)
CREAT SERPL-MCNC: 1.47 MG/DL (ref 0.6–1.3)
ECHO AO ARCH DIAM: 3.1 CM
ECHO AO ASC DIAM: 3.63 CM
ECHO AO ROOT DIAM: 3.06 CM
ECHO AV AREA PEAK VELOCITY: 2.7 CM2
ECHO AV AREA VTI: 3.1 CM2
ECHO AV AREA/BSA PEAK VELOCITY: 1.2 CM2/M2
ECHO AV AREA/BSA VTI: 1.4 CM2/M2
ECHO AV MEAN GRADIENT: 4.1 MMHG
ECHO AV PEAK GRADIENT: 7.4 MMHG
ECHO AV PEAK VELOCITY: 135.74 CM/S
ECHO AV VTI: 28.27 CM
ECHO IVC PROX: 1.2 CM
ECHO LA MAJOR AXIS: 4.78 CM
ECHO LA VOL 2C: 42.34 ML (ref 18–58)
ECHO LA VOL 4C: 48.02 ML (ref 18–58)
ECHO LA VOL BP: 51.24 ML (ref 18–58)
ECHO LA VOL/BSA BIPLANE: 23.76 ML/M2 (ref 16–28)
ECHO LA VOLUME INDEX A2C: 19.63 ML/M2 (ref 16–28)
ECHO LA VOLUME INDEX A4C: 22.26 ML/M2 (ref 16–28)
ECHO LV E' LATERAL VELOCITY: 10 CM/S
ECHO LV E' SEPTAL VELOCITY: 7 CM/S
ECHO LV EDV A2C: 71.1 ML
ECHO LV EDV A4C: 76.4 ML
ECHO LV EDV BP: 74.7 ML (ref 67–155)
ECHO LV EDV INDEX A4C: 35.4 ML/M2
ECHO LV EDV INDEX BP: 34.6 ML/M2
ECHO LV EDV NDEX A2C: 33 ML/M2
ECHO LV EJECTION FRACTION A2C: 45 %
ECHO LV EJECTION FRACTION A4C: 51 %
ECHO LV EJECTION FRACTION BIPLANE: 48.7 % (ref 55–100)
ECHO LV ESV A2C: 38.8 ML
ECHO LV ESV A4C: 37.7 ML
ECHO LV ESV BP: 38.3 ML (ref 22–58)
ECHO LV ESV INDEX A2C: 18 ML/M2
ECHO LV ESV INDEX A4C: 17.5 ML/M2
ECHO LV ESV INDEX BP: 17.8 ML/M2
ECHO LV INTERNAL DIMENSION DIASTOLIC: 4.55 CM (ref 4.2–5.9)
ECHO LV INTERNAL DIMENSION SYSTOLIC: 3.39 CM
ECHO LV IVSD: 1.24 CM (ref 0.6–1)
ECHO LV MASS 2D: 250.2 G (ref 88–224)
ECHO LV MASS INDEX 2D: 116 G/M2 (ref 49–115)
ECHO LV POSTERIOR WALL DIASTOLIC: 1.24 CM (ref 0.6–1)
ECHO LVOT DIAM: 2.12 CM
ECHO LVOT PEAK GRADIENT: 4.3 MMHG
ECHO LVOT PEAK VELOCITY: 103.61 CM/S
ECHO LVOT VTI: 25.08 CM
ECHO MV A VELOCITY: 82.69 CM/S
ECHO MV AREA PHT: 3.7 CM2
ECHO MV E DECELERATION TIME (DT): 206.4 MS
ECHO MV E VELOCITY: 73.86 CM/S
ECHO MV E/A RATIO: 0.89
ECHO MV E/E' LATERAL: 7.39
ECHO MV E/E' RATIO (AVERAGED): 8.97
ECHO MV E/E' SEPTAL: 10.55
ECHO MV PRESSURE HALF TIME (PHT): 59.9 MS
ECHO PV MAX VELOCITY: 88.94 CM/S
ECHO PV PEAK GRADIENT: 3.2 MMHG
ECHO RA AREA 4C: 17.05 CM2
ECHO RV INTERNAL DIMENSION: 3.27 CM
ECHO TRICUSPID ANNULAR PEAK SYSTOLIC VELOCITY: 2.3 CM/S
ERYTHROCYTE [DISTWIDTH] IN BLOOD BY AUTOMATED COUNT: 15 % (ref 11.6–14.5)
EST. AVERAGE GLUCOSE BLD GHB EST-MCNC: 131 MG/DL
FOLATE SERPL-MCNC: >20 NG/ML (ref 3.1–17.5)
GLUCOSE SERPL-MCNC: 89 MG/DL (ref 74–99)
HBA1C MFR BLD: 6.2 % (ref 4.2–5.6)
HCT VFR BLD AUTO: 37.2 % (ref 36–48)
HDLC SERPL-MCNC: 42 MG/DL (ref 40–60)
HDLC SERPL: 5 {RATIO} (ref 0–5)
HGB BLD-MCNC: 12 G/DL (ref 13–16)
LDLC SERPL CALC-MCNC: 135.8 MG/DL (ref 0–100)
LEFT CCA DIST DIAS: 20.4 CM/S
LEFT CCA DIST SYS: 73.7 CM/S
LEFT CCA MID DIAS: 29.27 CM/S
LEFT CCA MID SYS: 101.3 CM/S
LEFT CCA PROX DIAS: 38.6 CM/S
LEFT CCA PROX SYS: 147.8 CM/S
LEFT ECA DIAS: 26.07 CM/S
LEFT ECA SYS: 64.8 CM/S
LEFT ICA MID DIAS: 28.4 CM/S
LEFT ICA MID SYS: 52.9 CM/S
LEFT ICA PROX DIAS: 20.6 CM/S
LEFT ICA PROX SYS: 42.4 CM/S
LEFT ICA/CCA SYS: 0.72
LEFT SUBCLAVIAN DIAS: 3.72 CM/S
LEFT SUBCLAVIAN SYS: 164 CM/S
LEFT VERTEBRAL DIAS: 38.31 CM/S
LEFT VERTEBRAL SYS: 92.7 CM/S
LIPID PROFILE,FLP: ABNORMAL
MCH RBC QN AUTO: 28.6 PG (ref 24–34)
MCHC RBC AUTO-ENTMCNC: 32.3 G/DL (ref 31–37)
MCV RBC AUTO: 88.8 FL (ref 74–97)
PLATELET # BLD AUTO: 214 K/UL (ref 135–420)
PMV BLD AUTO: 9.4 FL (ref 9.2–11.8)
POTASSIUM SERPL-SCNC: 4.4 MMOL/L (ref 3.5–5.5)
RBC # BLD AUTO: 4.19 M/UL (ref 4.7–5.5)
RIGHT CCA DIST DIAS: 16.1 CM/S
RIGHT CCA DIST SYS: 58.9 CM/S
RIGHT CCA MID DIAS: 22.64 CM/S
RIGHT CCA MID SYS: 90.79 CM/S
RIGHT CCA PROX DIAS: 15 CM/S
RIGHT CCA PROX SYS: 71 CM/S
RIGHT ECA DIAS: 17.15 CM/S
RIGHT ECA SYS: 61.1 CM/S
RIGHT ICA DIST DIAS: 23.9 CM/S
RIGHT ICA DIST SYS: 52.4 CM/S
RIGHT ICA MID DIAS: 18.9 CM/S
RIGHT ICA MID SYS: 44.6 CM/S
RIGHT ICA PROX DIAS: 14 CM/S
RIGHT ICA PROX SYS: 33.9 CM/S
RIGHT ICA/CCA SYS: 0.9
RIGHT SUBCLAVIAN DIAS: 24.63 CM/S
RIGHT SUBCLAVIAN SYS: 112.9 CM/S
RIGHT VERTEBRAL DIAS: 43.48 CM/S
RIGHT VERTEBRAL SYS: 87.5 CM/S
SODIUM SERPL-SCNC: 142 MMOL/L (ref 136–145)
TRIGL SERPL-MCNC: 171 MG/DL (ref ?–150)
TSH SERPL DL<=0.05 MIU/L-ACNC: 0.38 UIU/ML (ref 0.36–3.74)
VIT B12 SERPL-MCNC: 1133 PG/ML (ref 211–911)
VLDLC SERPL CALC-MCNC: 34.2 MG/DL
WBC # BLD AUTO: 9.3 K/UL (ref 4.6–13.2)

## 2019-06-19 PROCEDURE — 74011000250 HC RX REV CODE- 250: Performed by: HOSPITALIST

## 2019-06-19 PROCEDURE — 84443 ASSAY THYROID STIM HORMONE: CPT

## 2019-06-19 PROCEDURE — 93880 EXTRACRANIAL BILAT STUDY: CPT

## 2019-06-19 PROCEDURE — 74011000258 HC RX REV CODE- 258: Performed by: HOSPITALIST

## 2019-06-19 PROCEDURE — 80061 LIPID PANEL: CPT

## 2019-06-19 PROCEDURE — 94660 CPAP INITIATION&MGMT: CPT

## 2019-06-19 PROCEDURE — 74011250637 HC RX REV CODE- 250/637: Performed by: PSYCHIATRY & NEUROLOGY

## 2019-06-19 PROCEDURE — 74011250636 HC RX REV CODE- 250/636: Performed by: HOSPITALIST

## 2019-06-19 PROCEDURE — 82607 VITAMIN B-12: CPT

## 2019-06-19 PROCEDURE — 65660000000 HC RM CCU STEPDOWN

## 2019-06-19 PROCEDURE — 77030035694 HC MSK BIPAP FLL FAC PERFMAX PHIL -B

## 2019-06-19 PROCEDURE — 92610 EVALUATE SWALLOWING FUNCTION: CPT

## 2019-06-19 PROCEDURE — 97163 PT EVAL HIGH COMPLEX 45 MIN: CPT

## 2019-06-19 PROCEDURE — 83036 HEMOGLOBIN GLYCOSYLATED A1C: CPT

## 2019-06-19 PROCEDURE — 93306 TTE W/DOPPLER COMPLETE: CPT

## 2019-06-19 PROCEDURE — 80048 BASIC METABOLIC PNL TOTAL CA: CPT

## 2019-06-19 PROCEDURE — 85027 COMPLETE CBC AUTOMATED: CPT

## 2019-06-19 PROCEDURE — 92526 ORAL FUNCTION THERAPY: CPT

## 2019-06-19 PROCEDURE — 97165 OT EVAL LOW COMPLEX 30 MIN: CPT

## 2019-06-19 PROCEDURE — 77030018846 HC SOL IRR STRL H20 ICUM -A

## 2019-06-19 PROCEDURE — 36415 COLL VENOUS BLD VENIPUNCTURE: CPT

## 2019-06-19 RX ORDER — IBUPROFEN 800 MG/1
800 TABLET ORAL 3 TIMES DAILY
COMMUNITY
End: 2019-06-21

## 2019-06-19 RX ORDER — DOCUSATE SODIUM 100 MG/1
100 CAPSULE, LIQUID FILLED ORAL 2 TIMES DAILY
COMMUNITY

## 2019-06-19 RX ORDER — METHOCARBAMOL 500 MG/1
500 TABLET, FILM COATED ORAL 4 TIMES DAILY
COMMUNITY

## 2019-06-19 RX ORDER — SODIUM CHLORIDE 9 MG/ML
INJECTION INTRAMUSCULAR; INTRAVENOUS; SUBCUTANEOUS
Status: DISPENSED
Start: 2019-06-19 | End: 2019-06-19

## 2019-06-19 RX ORDER — LISINOPRIL 20 MG/1
20 TABLET ORAL DAILY
COMMUNITY

## 2019-06-19 RX ORDER — BISMUTH SUBSALICYLATE 262 MG
1 TABLET,CHEWABLE ORAL DAILY
COMMUNITY

## 2019-06-19 RX ORDER — MELOXICAM 15 MG/1
15 TABLET ORAL
COMMUNITY
End: 2019-06-21

## 2019-06-19 RX ORDER — PRAVASTATIN SODIUM 40 MG/1
40 TABLET ORAL
COMMUNITY
End: 2019-06-21

## 2019-06-19 RX ORDER — SILDENAFIL 100 MG/1
100 TABLET, FILM COATED ORAL AS NEEDED
COMMUNITY
End: 2019-06-21

## 2019-06-19 RX ORDER — CLOPIDOGREL BISULFATE 75 MG/1
75 TABLET ORAL DAILY
Status: DISCONTINUED | OUTPATIENT
Start: 2019-06-19 | End: 2019-06-21 | Stop reason: HOSPADM

## 2019-06-19 RX ORDER — LANOLIN ALCOHOL/MO/W.PET/CERES
3 CREAM (GRAM) TOPICAL
COMMUNITY

## 2019-06-19 RX ORDER — TRAZODONE HYDROCHLORIDE 100 MG/1
100 TABLET ORAL
COMMUNITY
End: 2019-06-21

## 2019-06-19 RX ORDER — MELATONIN
1000 DAILY
COMMUNITY

## 2019-06-19 RX ORDER — BUDESONIDE AND FORMOTEROL FUMARATE DIHYDRATE 160; 4.5 UG/1; UG/1
2 AEROSOL RESPIRATORY (INHALATION) 2 TIMES DAILY
COMMUNITY

## 2019-06-19 RX ORDER — OMEPRAZOLE 20 MG/1
20 CAPSULE, DELAYED RELEASE ORAL DAILY
COMMUNITY

## 2019-06-19 RX ORDER — PRIMIDONE 50 MG/1
50 TABLET ORAL 3 TIMES DAILY
COMMUNITY

## 2019-06-19 RX ORDER — HYDROCODONE BITARTRATE AND ACETAMINOPHEN 5; 325 MG/1; MG/1
1 TABLET ORAL
COMMUNITY
End: 2019-06-21

## 2019-06-19 RX ORDER — SODIUM CHLORIDE 9 MG/ML
30 INJECTION INTRAMUSCULAR; INTRAVENOUS; SUBCUTANEOUS ONCE
Status: COMPLETED | OUTPATIENT
Start: 2019-06-19 | End: 2019-06-19

## 2019-06-19 RX ORDER — DOXEPIN HYDROCHLORIDE 50 MG/1
50 CAPSULE ORAL
COMMUNITY
End: 2019-06-21

## 2019-06-19 RX ORDER — MECLIZINE HCL 12.5 MG 12.5 MG/1
12.5 TABLET ORAL
COMMUNITY

## 2019-06-19 RX ADMIN — HEPARIN SODIUM 5000 UNITS: 5000 INJECTION INTRAVENOUS; SUBCUTANEOUS at 18:40

## 2019-06-19 RX ADMIN — DOXYCYCLINE 100 MG: 100 INJECTION, POWDER, LYOPHILIZED, FOR SOLUTION INTRAVENOUS at 00:00

## 2019-06-19 RX ADMIN — SODIUM CHLORIDE AND POTASSIUM CHLORIDE: 9; 1.49 INJECTION, SOLUTION INTRAVENOUS at 06:26

## 2019-06-19 RX ADMIN — DOXYCYCLINE 100 MG: 100 INJECTION, POWDER, LYOPHILIZED, FOR SOLUTION INTRAVENOUS at 10:30

## 2019-06-19 RX ADMIN — Medication 10 ML: at 00:00

## 2019-06-19 RX ADMIN — SODIUM CHLORIDE AND POTASSIUM CHLORIDE: 9; 1.49 INJECTION, SOLUTION INTRAVENOUS at 22:21

## 2019-06-19 RX ADMIN — DOXYCYCLINE 100 MG: 100 INJECTION, POWDER, LYOPHILIZED, FOR SOLUTION INTRAVENOUS at 22:21

## 2019-06-19 RX ADMIN — SODIUM CHLORIDE 30 ML: 9 INJECTION, SOLUTION INTRAMUSCULAR; INTRAVENOUS; SUBCUTANEOUS at 09:52

## 2019-06-19 RX ADMIN — HEPARIN SODIUM 5000 UNITS: 5000 INJECTION INTRAVENOUS; SUBCUTANEOUS at 03:35

## 2019-06-19 RX ADMIN — FOLIC ACID: 5 INJECTION, SOLUTION INTRAMUSCULAR; INTRAVENOUS; SUBCUTANEOUS at 10:29

## 2019-06-19 RX ADMIN — Medication 10 ML: at 05:36

## 2019-06-19 RX ADMIN — CLOPIDOGREL BISULFATE 75 MG: 75 TABLET, FILM COATED ORAL at 10:30

## 2019-06-19 RX ADMIN — HEPARIN SODIUM 5000 UNITS: 5000 INJECTION INTRAVENOUS; SUBCUTANEOUS at 10:31

## 2019-06-19 RX ADMIN — CEFTRIAXONE 1 G: 1 INJECTION, POWDER, FOR SOLUTION INTRAMUSCULAR; INTRAVENOUS at 10:31

## 2019-06-19 NOTE — PROGRESS NOTES
Bedside shift change report given to [address] [place] [address] (oncoming nurse) by Vanesa Morris RN (offgoing nurse). Report included the following information SBAR, Kardex, ED Summary, Intake/Output, MAR, Accordion, Recent Results and Alarm Parameters . 0800 Shift assessment complete. Dr. Brooke Sacks discussed allergy of aspirin with patient. Told to discontinue aspirin. Shift Summary: Shift uneventful. No complaints of chest pain or shortness of breath. Call light is within reach.

## 2019-06-19 NOTE — CONSULTS
NEUROLOGY CONSULTATION NOTE    Patient: Finn Mauricio MRN: [id number]  CSN: [id number]    YOB: 1960  Age: 66 y.o. Sex: male    DOA: 6/18/2019 LOS:  LOS: 1 day        Requesting Physician:Dr. Asia Winter  Reason for Consultation: stroke              HISTORY OF PRESENT ILLNESS:   Finn Mauricio is a 66 y.o. male with PMH of neuropathy, HTN, depression who I have been asked to see for stroke. Patient was found by her son being confused, with stool incontinence. Patient stated that his left arm was weak, felt funny. He tried to stand up, but fell down. EMS was called. Patient was very lethargic initially and unable to explain what had actually happened to him, but as he received some fluids and started to wake up more. MRI brain showed  multiple scattered foci of acute infarct involving the bilateral cerebral and cerebellar series. Most of the supratentorial foci are frontoparietal in location. Patient denies any history of stroke, but he is allergic to aspirin. Patient related that his blood pressure has been fluctuating due to insomnia. Stroke Work-up:  Mra Brain Wo Cont Result Date: 6/19/2019  IMPRESSION: MRA head within normal limits. No focal high grade stenosis or aneurysm. Mri Brain Wo Cont Result Date: 6/19/2019  IMPRESSION: 1.  Multiple scattered foci of acute infarct involving the bilateral cerebral and cerebellar series. Most of the supratentorial foci are frontoparietal in location. No evidence of associated hemorrhage or mass effect. Clinical correlation for possible central source of emboli is recommended. 2. Moderate diffuse paranasal sinus disease. No definite air-fluid level to suggest acute sinusitis. Preliminary report provided by another radiologist, Dr. Juani Reeves, 5:52 PM 6/18/2019, discussed with Dr. Asia Winter. Ct Head Wo Cont Result Date: 6/18/2019    IMPRESSION: 1. No acute intracranial abnormalities are identified.    No CT evidence to suggest acute intracranial hematoma, cortical infarct, or mass effect/mass lesion. 2. Paranasal sinus inflammatory changes. Xr Chest Port    Result Date: 6/18/2019    IMPRESSION: Mild cardiomegaly. No radiographic evidence of acute pulmonary process. Echocardiogram:   Lipid panel:   Lab Results   Component Value Date/Time    Cholesterol, total 212 (H) 06/19/2019 02:45 AM    HDL Cholesterol 42 06/19/2019 02:45 AM    LDL, calculated 135.8 (H) 06/19/2019 02:45 AM    VLDL, calculated 34.2 06/19/2019 02:45 AM    Triglyceride 171 (H) 06/19/2019 02:45 AM    CHOL/HDL Ratio 5.0 06/19/2019 02:45 AM     HbA1c:   Lab Results   Component Value Date/Time    Hemoglobin A1c 6.2 (H) 06/19/2019 02:45 AM     PAST MEDICAL HISTORY:  Past Medical History:   Diagnosis Date    DJD (degenerative joint disease)     Hypertension     Neuropathy      PAST SURGICAL HISTORY:  History reviewed. No pertinent surgical history. FAMILY HISTORY:  History reviewed. No pertinent family history. SOCIAL HISTORY:  Social History     Socioeconomic History    Marital status: LEGALLY      Spouse name: Not on file    Number of children: Not on file    Years of education: Not on file    Highest education level: Not on file   Tobacco Use    Smoking status: Never Smoker    Smokeless tobacco: Never Used   Substance and Sexual Activity    Drug use: Never     MEDICATIONS:  Current Facility-Administered Medications   Medication Dose Route Frequency    0.9% NaCl bacteriostatic (NORMAL SALINE) 0.9 % injection 30 mL  30 mL IntraVENous ONCE    0.9% sodium chloride with KCl 20 mEq/L infusion   IntraVENous CONTINUOUS    0.9% sodium chloride 1,000 mL with mvi, adult no. 4 with vit K 10 mL, thiamine 603 mg, folic acid 1 mg infusion   IntraVENous Q24H    heparin (porcine) injection 5,000 Units  5,000 Units SubCUTAneous Q8H    cefTRIAXone (ROCEPHIN) 1 g in sterile water (preservative free) 10 mL IV syringe  1 g IntraVENous Q24H    doxycycline (VIBRAMYCIN) 100 mg in 0.9% sodium chloride (MBP/ADV) 100 mL MBP  100 mg IntraVENous Q12H    sodium chloride (NS) flush 5-40 mL  5-40 mL IntraVENous Q8H    sodium chloride (NS) flush 5-40 mL  5-40 mL IntraVENous PRN     Prior to Admission medications    Medication Sig Start Date End Date Taking? Authorizing Provider   gabapentin (NEURONTIN) 100 mg capsule Take 400 mg by mouth four (4) times daily. Yes Mariluz, MD Memo   Cetirizine (ZYRTEC) 10 mg cap Take  by mouth. Yes Other, MD Memo   QUEtiapine (SEROQUEL) 100 mg tablet Take 100 mg by mouth nightly. Yes Other, MD Memo   DULoxetine (CYMBALTA) 20 mg capsule Take 20 mg by mouth three (3) times daily. Yes Other, MD Memo       ALLERGIES:  Allergies   Allergen Reactions    Aspirin Anaphylaxis and Rash    Pork/Porcine Containing Products Anaphylaxis, Rash and Nausea and Vomiting    Egg Rash and Nausea and Vomiting    Simvastatin Rash       Review of Systems  GENERAL: No fevers or chills. HEENT: No change in vision, earache, tinnitus, sore throat or sinus congestion. NECK: No pain or stiffness. CARDIOVASCULAR: No chest pain or pressure. No palpitations. PULMONARY: No shortness of breath, cough or wheeze. GASTROINTESTINAL: No abdominal pain, nausea, vomiting or diarrhea. GENITOURINARY: No urinary frequency, urgency, hesitancy or dysuria. MUSCULOSKELETAL: No joint or muscle pain, no back pain, no recent trauma. DERMATOLOGIC: No rash, no itching, no lesions. ENDOCRINE: No polyuria, polydipsia, no heat or cold intolerance. No recent change in weight. HEMATOLOGICAL: No anemia or easy bruising or bleeding. NEUROLOGIC: left arm and  leg weakness. PHYSICAL EXAMINATION:     Visit Vitals  BP (!) 147/97 (BP 1 Location: Right arm, BP Patient Position: At rest)   Pulse 84   Temp 98.3 °F (36.8 °C)   Resp 18   Ht 5' 8\" (1.727 m)   Wt 102.6 kg (226 lb 3.1 oz)   SpO2 100%   BMI 34.39 kg/m²      O2 Device: CPAP mask  GENERAL: Pleasant, in no apparent distress.   HEENT: Moist mucous membranes, sclerae anicteric, scalp is atraumatic. CVS: Regular rate and rhythm, no murmurs or gallops. No carotid bruits. PULMONARY: Clear to auscultation bilaterally. No rales or rhonchi. No wheezing. EXTREMITIES: Normal range of motion at all sites. No deformities. ABDOMEN: Soft, nontender. SKIN: No rashes or ecchymoses. Warm and dry. NEUROLOGIC: Alert and oriented x3. Speech is fluent without any aphasia or dysarthria. Cranial nerves: Face is symmetric with symmetric smile. Facial sensation is intact. Extraocular movements are intact with no nystagmus. Visual fields are full to confrontation. PERRL. Tongue is midline. Palate elevates symmetrically. Hearing intact to speech. Sternocleidomastoid and trapezius strengths are full bilaterally. Motor: 4/5 LUE, 4+/5 LLE, 5/5 RUE, and RLE. Sensory: Intact to pinprick and touch on all four. Normal vibratory sensation on toes bilaterally. Coordination: dysmetria at left upper extremity. Deep tendon reflexes: 2+ at biceps, brachioradialis, patella and ankles bilaterally. Toes are down-going bilaterally. Gait assessment: deferred. Labs: Results:       Chemistry Recent Labs     06/19/19 0245 06/18/19  1843 06/18/19  0450   GLU 89 115* 142*    140 136   K 4.4 4.3 4.6   * 108 104   CO2 24 23 25   BUN 22* 26* 30*   CREA 1.47* 1.94* 2.82*   CA 8.1* 8.4* 9.0   AGAP 7 9 7   BUCR 15 13 11*   AP  --   --  92   TP  --   --  7.8   ALB  --   --  3.4   GLOB  --   --  4.4*   AGRAT  --   --  0.8      CBC w/Diff Recent Labs     06/19/19 0245 06/18/19  0450   WBC 9.3 12.3   RBC 4.19* 4.79   HGB 12.0* 14.0   HCT 37.2 42.5    220   GRANS  --  78*   LYMPH  --  12*   EOS  --  0      Cardiac Enzymes No results for input(s): CPK, CKND1, MITCHELL in the last 72 hours. No lab exists for component: CKRMB, TROIP   Coagulation No results for input(s): PTP, INR, APTT in the last 72 hours.     No lab exists for component: INREXT    Lipid Panel Lab Results Component Value Date/Time    Cholesterol, total 212 (H) 06/19/2019 02:45 AM    HDL Cholesterol 42 06/19/2019 02:45 AM    LDL, calculated 135.8 (H) 06/19/2019 02:45 AM    VLDL, calculated 34.2 06/19/2019 02:45 AM    Triglyceride 171 (H) 06/19/2019 02:45 AM    CHOL/HDL Ratio 5.0 06/19/2019 02:45 AM      BNP No results for input(s): BNPP in the last 72 hours. Liver Enzymes Recent Labs     06/18/19  0450   TP 7.8   ALB 3.4   AP 92   SGOT 31      Thyroid Studies No results found for: T4, T3U, TSH, TSHEXT       Radiology:  Xr Abd (kub)    Result Date: 6/19/2019  EXAM: KUB INDICATION: Clearance for MRI. Patient complaining of incontinence of bowel. COMPARISON: None. _______________ FINDINGS: Gas pattern is normal. No opaque foreign body. Calcifications in the medial upper legs, right greater than left, possibly myositis ossificans on the right, and/or phleboliths on the left. _______________     IMPRESSION: No opaque foreign body, cleared for MRI. Mra Brain Wo Cont    Result Date: 6/19/2019  EXAM: MRA HEAD INDICATION: Left hand weakness, altered mental status COMPARISON: No prior study TECHNIQUE:  MR angiography of the head was performed utilizing 3-D time of flight axial acquisitions with multiplanar reconstructions. _______________________ FINDINGS:  There is no evidence of aneurysm. There is no focal high-grade stenosis within the intracranial arteries. Mild narrowing is seen in the bilateral carotid siphons without high grade stenosis. The carotid terminus is unremarkable. No focal anterior cerebral artery stenosis is seen. Left A1 segment is dominant. An anterior communicating artery is present. The middle cerebral artery bifurcations are unremarkable. Left vertebral artery is mildly dominant. PICA origins are unremarkable. The basilar artery is unremarkable. Posterior cerebral arteries are unremarkable. _______________________     IMPRESSION: MRA head within normal limits.   No focal high grade stenosis or aneurysm. Mri Brain Wo Cont    Result Date: 6/19/2019  EXAM: MRI BRAIN W/O CONTRAST INDICATION: Left hand weakness, altered mental status COMPARISON: No prior study TECHNIQUE: Multiplanar multi sequence MR imaging of the brain was performed utilizing axial T2, FLAIR, diffusion, T2 gradient echo, and multiplanar T1 pre contrast imaging. No gadolinium was administered due to specific clinician request. _______________________ FINDINGS: VENTRICLES/EXTRA-AXIAL SPACES: The ventricles and sulci are normal in their size and configuration. BRAIN PARENCHYMA: There are small patchy foci of abnormal restricted diffusion involving the bilateral cerebral hemispheres within the frontal and parietal lobes. These foci involve cortex and subcortical white matter. There are small foci of involvement within the precentral gyri bilaterally. Minimal involvement of the left postcentral gyrus is also seen. Additionally, there are areas of restricted diffusion within the bilateral cerebellar hemispheres posteriorly. Additional focus of restricted diffusion is present along the left posterior occipital cortex. These areas do demonstrate T2/FLAIR hyperintensity. No associated hemorrhage or mass effect is seen. No evidence of intracranial mass effect, midline shift, or herniation. No susceptibility artifact to suggest intraparenchymal hemorrhage. VASCULATURE:  The major intracranial vascular flow voids are grossly normal. ORBITS: The visualized orbits are unremarkable. PARANASAL SINUSES: Moderate mucoperiosteal thickening is seen throughout the paranasal sinuses. No air-fluid levels are present. MASTOID AIR CELLS: Visualized mastoid air cells are clear. OSSEOUS STRUCTURES: Degenerative changes are seen in visualized upper cervical spine. OTHER: None.  ________________________     IMPRESSION: 1.  Multiple scattered foci of acute infarct involving the bilateral cerebral and cerebellar series.  Most of the supratentorial foci are frontoparietal in location. No evidence of associated hemorrhage or mass effect. Clinical correlation for possible central source of emboli is recommended. 2. Moderate diffuse paranasal sinus disease. No definite air-fluid level to suggest acute sinusitis. Preliminary report provided by another radiologist, Dr. William Pryor, 5:52 PM 6/18/2019, discussed with Dr. Rosalia Mcbride. Ct Head Wo Cont    Result Date: 6/18/2019  EXAM: CT head CLINICAL INDICATION/HISTORY: Altered level of consciousness, confusion. COMPARISON: None. TECHNIQUE: Axial CT imaging of the head  was obtained from skull base to vertex without intravenous contrast. Coronal and sagittal reconstructions were obtained. One or more dose reduction techniques were used on this CT: automated exposure control, adjustment of the mAs and/or kVp according to patient's size, and iterative reconstruction techniques. The specific techniques utilized on this CT exam have been documented in the patient's electronic medical record. Digital imaging and communications and medicine (DICOM) format image data are available to nonaffiliated external healthcare facilities or entities on a secure, media free, reciprocally searchable basis with patient authorization for at least a 12 month period after this study. _______________ FINDINGS: BRAIN AND POSTERIOR FOSSA: The sulci, folia, ventricles and basal cisterns are within normal limits for the patient?s age. There is no intracranial hemorrhage, mass effect, or shift of midline structures. There are no areas of abnormal parenchymal attenuation. EXTRA-AXIAL SPACES AND MENINGES: There are no abnormal extra-axial fluid collections. CALVARIUM: No acute osseous abnormality. SINUSES: Paranasal sinus partial opacification and mucosal thickening. Mastoids clear. OTHER: None. _______________     IMPRESSION: 1. No acute intracranial abnormalities are identified.    No CT evidence to suggest acute intracranial hematoma, cortical infarct, or mass effect/mass lesion. 2. Paranasal sinus inflammatory changes. Xr Chest Port    Result Date: 6/18/2019  EXAM: Portable chest HISTORY: Hypotensive. Unresponsive. COMPARISON: None. TECHNIQUE: Single portable view. _______________ FINDINGS: SUPPORT STRUCTURES: None HEART AND MEDIASTINUM: Mild cardiomegaly. Normal pulmonary vasculature. LUNGS AND PLEURAL SPACES: The lungs are clear. BONES AND SOFT TISSUES: Bony structures are normal. _______________     IMPRESSION: Mild cardiomegaly. No radiographic evidence of acute pulmonary process. ASSESSMENT/IMPRESSION:  45-year-old male with past medical history of hypertension, presents with acute encephalopathy and left-sided weakness. 1. Acute encephalopathy: resolved,metabolic encephalopathy secondary to acute kidney failure. 2. Acute ischemic stroke: scattered at bilateral cerebral and cerebellum, embolic etiology. Patient is out of tPA window. He is not candidate for thrombectomy. 3. Acute kidney failure. RECOMMENDATIONS:  1. Start Plavix 75 mg daily and Lipitor 80 mg daily  2. Pending on carotid Doppler and TTE. 3. PT/OT/ST. 4. Permissive hypertension, blood pressure less than 220/110. I will follow the patient.  Please do not hesitate to return with any questions.    ------------------------------------  Maddy Cardenas MD  6/19/2019  8:42 AM

## 2019-06-19 NOTE — PROGRESS NOTES
[facility]U/IPR REFERRAL CONTACT NOTE  [id number] [facility]    Re: [address] for IP Rehab Screen received. Will review case and advise as appropriate. Thank you for the consult.     Gisselle Daugherty

## 2019-06-19 NOTE — PROGRESS NOTES
Problem: Dysphagia (Adult)  Goal: *Acute Goals and Plan of Care (Insert Text)  Description  Patient presents with (-) dysphagia. Patient demonstrating positive rotary chew, oral clearance and timely swallow initiation; tolerating all consistencies including thin liquids via cup sip and straw presentation without overt s/sx aspiration. Recommend continue cardiac diet with no further ST services indicated at this time as patient WNL related to swallowing function. Aspiration symptoms, risks and precautions d/w patient and patient's ex-spouse at length. Outcome: Goal Met  SPEECH-LANGUAGE PATHOLOGY BEDSIDE SWALLOW EVALUATION AND DISCHARGE  Patient: Lisbeth Dance (83 y.o. male)  Date: 6/19/2019  Primary Diagnosis: RODRIGUEZ (acute kidney injury) ([facility] [address] [N17.9]  Altered mental status [R41.82]      Precautions: Aspiration, falls    ASSESSMENT AND RECOMMENDATIONS:  As above. Patient will not require skilled intervention from speech-language pathology at this time. Discharge Recommendations: To Be Determined     SUBJECTIVE:   Patient stated, \"I know I have a lot of things to do ahead of me. Have to make a life change. \"     OBJECTIVE:     Past Medical History:   Diagnosis Date    DJD (degenerative joint disease)     Hypertension     Neuropathy    History reviewed. No pertinent surgical history.   Prior Level of Function/Home Situation:   Home Situation  Home Environment: Private residence  # Steps to Enter: 0  One/Two Story Residence: Two story  # of Interior Steps: 21  Interior Rails: Both  Living Alone: No(son living with pt)  Support Systems: Child(paula)  Patient Expects to be Discharged to[de-identified] Private residence  Current DME Used/Available at Home: Cane, straight, CPAP, Nebulizer(reports CPAP and neb at other house; indepent amb w/o AD)  Tub or Shower Type: Tub/Shower combination  Diet prior to admission: Regular  Current Diet:  Cardiac   Barriers to Learning/Limitations: None  Compensate with: visual, verbal, tactile, kinesthetic cues/model  Cognitive and Communication Status:  Neurologic State: Alert  Orientation Level: Oriented X4  Cognition: Appropriate decision making, Follows commands  Perception: Appears intact  Perseveration: No perseveration noted  Safety/Judgement: Insight into deficits  Oral Assessment:  Oral Assessment  Labial: No impairment  Dentition: Limited  Oral Hygiene: (Good)  Lingual: No impairment  Velum: No impairment  Mandible: No impairment  Gag Reflex: Other (comment)(Not tested)  P.O. Trials:  Patient Position: (Sitting on edge of bed)  Vocal quality prior to P.O.: Back tone focus, Strain  Consistency Presented: Ice chips, Mechanical soft, Mixed consistency, Puree, Solid, Thin liquid  How Presented: Self-fed/presented, Spoon, Straw  Bolus Acceptance: No impairment  Bolus Formation/Control: No impairment  Propulsion: No impairment  Oral Residue: None  Initiation of Swallow: No impairment  Laryngeal Elevation: Functional  Aspiration Signs/Symptoms: None  Pharyngeal Phase Characteristics: No impairment, issues, or problems   Effective Modifications: Alternate liquids/solids, Straw, Spoon, Small sips and bites  Cues for Modifications: Minimal  Oral Phase Severity: No impairment  Pharyngeal Phase Severity : No impairment  After treatment:   ?            Patient left in no apparent distress sitting up in chair  ? Patient left in no apparent distress in bed  ? Call bell left within reach  ? Nursing notified  ? Caregiver present  ? Bed alarm activated  COMMUNICATION/EDUCATION:   ?           The patient?s plan of care including recommendations, planned interventions,            and recommended diet changes were discussed with: Occupational Therapist, Registered Nurse and Physician. ? Posted safety precautions in patient's room. ? Patient/family have participated as able in goal setting and plan of care.   ?            Patient/family agree to work toward stated goals and plan of care. ?            Patient understands intent and goals of therapy, but is neutral about his/her                       participation. ? Patient is unable to participate in goal setting and plan of care. Thank you for this referral.  ABY Martinez.Ed, CCC-SLP  Time Calculation: 27 mins

## 2019-06-19 NOTE — PROGRESS NOTES
INITIAL NUTRITION ASSESSMENT     RECOMMENDATIONS/PLAN:   Other: Continue w/ POC  Monitor labs/lytes, PO intakes, skin integrity, wt, fluid status, BM    REASON FOR ASSESSMENT:     []  RN Referral:    [x] MST score >/=2  Malnutrition Screening Tool (MST):  Recently Lost Weight Without Trying: Unsure  If Yes, How Much Weight Loss: 2-13 lbs  Eating Poorly Due to Decreased Appetite: No  MST Score: 3      NUTRITION ASSESSMENT:   Client History: 66 yrs old Male admitted with metabolic encephalopathy, depression, UTI, RODRIGUEZ, chronic insomnia, noted left hand grasp weakness- MRI per MD showed bilateral embolic appearing infarctions in the brain, largest of which in cerebellum,      PMHx: derepression, degenerative joint disease, neuropathy, high blood pressure    Cultural/Episcopalian Food Preferences: None Identified    FOOD/NUTRITION HISTORY  Diet History: pt out of room unable to obtain hx at this time    Food Allergies:   [] Yes (Pork Eggs)   Pertinent PTA Medications: [x] Reviewed;     NUTRITION INTAKE   Diet Order:  AHA      Average PO Intake:       Patient Vitals for the past 100 hrs:   % Diet Eaten   06/18/19 1400 100 %      Pertinent Medications:  [x] Reviewed; banana bag,   Electrolyte Replacement Protocol: []K  []Mg  []PO4    Insulin:  [] SSI  [] Pre-meal   []  Basal   [] Drip  [x] None  Pt expected to meet estimated nutrient needs through next review:          [x]  Yes     [] No;  ANTHROPOMETRICS  Height: 5' 8\" (172.7 cm)       Weight: 102.6 kg (226 lb 3.1 oz)    BMI: 34.4 kg/m^2  -  obese (30%-39.9% BMI)        Weight change: no wt loss per chart review                                   Comparison to Reference Standards:  IBW: 154 lbs      %IBW: 147%      AdjBW: 78.2 kg    NUTRITION-FOCUSED PHYSICAL ASSESSMENT  Skin: No PU.      GI: No BM    BIOCHEMICAL DATA & MEDICAL TESTS  Pertinent Labs:  [x] Reviewed; [id number] cholesterol-212 LDL-135.8 hemglobin A1c-6.2     NUTRITION PRESCRIPTION  Calories: 2045 kcal/day based on Smith x 1.2  Protein: 56-70 g/day based on 0.8-1.0 g/kg of IBW  CHO: 256 g/day based on 50% of total energy  Fluid: 2045 ml/day based on 1 kcal/ml      NUTRITION DIAGNOSES:   1. Altered nutritional lab values related to diet non-compliance as evidence by triglycerides-171 cholesterol-212 LDL-135.8 hemglobin A1c-6.2       NUTRITION INTERVENTIONS:   INTERVENTIONS:        GOALS:  1. Other: Continue w/ POC 1. Encourage PO intake >50% at most meals by next review 7 days     LEARNING NEEDS (Diet, Supplementation, Food/Nutrient-Drug Interaction):   [] None Identified  [] Inpatient education provided/documented    [] Identified and patient:  [] Declined     [x] Was not appropriate/indicated  NUTRITION MONITORING /EVALUATION:   Follow PO intake  Monitor wt  Monitor renal labs, electrolytes, fluid status    [] Participated in Interdisciplinary Rounds  [x] [facility] Reviewed/Documented  DISCHARGE NUTRITION RECOMMENDATIONS ADDRESSED:     [x] Yes- recommended AHA diet     NUTRITION RISK:     []  At risk                     []  Not currently at risk     Will follow-up per policy.   Grzegorz Grande 9

## 2019-06-19 NOTE — PROGRESS NOTES
DC Plan: Discharge home with family assistance and MD follow up. Anticipate dc tomorrow. Chart reviewed. Attended IDRs with MD Elise Bee earlier today. Met with pt and pts ex wife at bedside. Family will drive home at discharge. Home address confirmed per face sheet. PT recommending [place] v outpatient. Pt states he is seen at the [place] and would prefer to set up services via the [place]. Declines HH services at this time. Pts PCP @ the [facility] [place] is MD Eloisa Fairbanks. Pt independent. Pt states he has a cane, CPAP (thru [facility] [place]). No dc concerns identified. CM will cont to follow for transition of care needs, . Care Management Interventions  PCP Verified by CM: Yes([facility])  Mode of Transport at Discharge:  Other (see comment)(FRIEND/FAMILY, LIKELY EX WIFE)  Transition of Care Consult (CM Consult): Discharge Planning  Current Support Network: Own Home  Confirm Follow Up Transport: Self(OR FAMILY)  Plan discussed with Pt/Family/Caregiver: Yes  Freedom of Choice Offered: Yes  Discharge Location  Discharge Placement: Home with family assistance

## 2019-06-19 NOTE — PROGRESS NOTES
Hospitalist Progress Note    Patient: Bharat Urena MRN: [id number]  CSN: [id number]    YOB: 1960  Age: 66 y.o. Sex: male    DOA: 6/18/2019 LOS:  LOS: 1 day                Assessment/Plan     Patient Active Problem List   Diagnosis Code    Altered mental status R41.82    RODRIGUEZ (acute kidney injury) ([facility] [address] N17.9    Toxic metabolic encephalopathy Y06    Recurrent major depressive disorder (HCC) F33.9    Acute CVA (cerebrovascular accident) ([facility] [address] I63.9            65 yo male admitted for AMS, left hand weakness. Still with left hand weakness    CVA - MRI showed Multiple scattered foci of acute infarct involving the bilateral cerebral and cerebellar series. Most of the supratentorial foci are frontoparietal in location. MRA head with no high grade stenosis  On plavix, recommend statin however patient is allergic to this. Also reports allergy to aspirin. Encephalopathy - likely secondary to CVA. Resolved. Permissive HTN    RODRIGUEZ - improving with IVF    UTI - continue with antibiotics, follow urine cultures. DVT prophylaxis with heparin              Disposition : 1-2 days    Review of systems  General: No fevers or chills. Cardiovascular: No chest pain or pressure. No palpitations. Pulmonary: No shortness of breath. Gastrointestinal: No nausea, vomiting. Physical Exam:  General: Awake, cooperative, no acute distress    HEENT: NC, Atraumatic. PERRLA, anicteric sclerae. Lungs: CTA Bilaterally. No Wheezing/Rhonchi/Rales. Heart:  S1 S2,  No murmur, No Rubs, No Gallops  Abdomen: Soft, Non distended, Non tender.  +Bowel sounds,   Extremities: No c/c/e  Psych:   Not anxious or agitated. Neurological Exam:   Mental Status:  Alert , co-operative , memory intact . Cranial Nerves:  Intact visual fields. PERRL, EOM's full, no nystagmus, no ptosis. Facial sensation is normal. Facial movement is symmetric. Palate is midline. Normal sternocleidomastoid strength. Tongue is midline. Hearing is intact bilaterally. Motor:  5/5 strength in upper and lower proximal and distal muscles. Normal bulk and tone. Reflexes:  Deep tendon reflexes 2+/4 and symmetrical.    Sensory:  Normal and symmetric bilaterally. Gait:  Not tested. Cerebellar:  No cerebellar signs present. Vital signs/Intake and Output:  Visit Vitals  BP (!) 160/103 (BP 1 Location: Right arm, BP Patient Position: At rest;Sitting)   Pulse 94   Temp 98.7 °F (37.1 °C)   Resp 18   Ht 5' 8\" (1.727 m)   Wt 103 kg (227 lb)   SpO2 98%   BMI 34.52 kg/m²     Current Shift:  No intake/output data recorded. Last three shifts:  06/18 0701 - 06/19 1900  In: 4756.7 [P.O.:1440; I.V.:3316.7]  Out: 500 [Urine:500]            Labs: Results:       Chemistry Recent Labs     06/19/19  0245 06/18/19  1843 06/18/19  0450   GLU 89 115* 142*    140 136   K 4.4 4.3 4.6   * 108 104   CO2 24 23 25   BUN 22* 26* 30*   CREA 1.47* 1.94* 2.82*   CA 8.1* 8.4* 9.0   AGAP 7 9 7   BUCR 15 13 11*   AP  --   --  92   TP  --   --  7.8   ALB  --   --  3.4   GLOB  --   --  4.4*   AGRAT  --   --  0.8      CBC w/Diff Recent Labs     06/19/19  0245 06/18/19  0450   WBC 9.3 12.3   RBC 4.19* 4.79   HGB 12.0* 14.0   HCT 37.2 42.5    220   GRANS  --  78*   LYMPH  --  12*   EOS  --  0      Cardiac Enzymes No results for input(s): CPK, CKND1, MITCHELL in the last 72 hours. No lab exists for component: CKRMB, TROIP   Coagulation No results for input(s): PTP, INR, APTT in the last 72 hours. No lab exists for component: INREXT    Lipid Panel Lab Results   Component Value Date/Time    Cholesterol, total 212 (H) 06/19/2019 02:45 AM    HDL Cholesterol 42 06/19/2019 02:45 AM    LDL, calculated 135.8 (H) 06/19/2019 02:45 AM    VLDL, calculated 34.2 06/19/2019 02:45 AM    Triglyceride 171 (H) 06/19/2019 02:45 AM    CHOL/HDL Ratio 5.0 06/19/2019 02:45 AM      BNP No results for input(s): BNPP in the last 72 hours.    Liver Enzymes Recent Labs 06/18/19  0450   TP 7.8   ALB 3.4   AP 92   SGOT 31      Thyroid Studies Lab Results   Component Value Date/Time    TSH 0.38 06/19/2019 02:45 AM        Procedures/imaging: see electronic medical records for all procedures/Xrays and details which were not copied into this note but were reviewed prior to creation of Plan

## 2019-06-19 NOTE — PROGRESS NOTES
Pt placed on hospital-issued resmed in auto-titrating cpap mode at 21% fio2. No distress noted. Will continue to monitor.

## 2019-06-19 NOTE — PROGRESS NOTES
Problem: Falls - Risk of  Goal: *Absence of Falls  Description  Document Easter Getting Fall Risk and appropriate interventions in the flowsheet.   6/19/2019 1826 by Sita Hazel  Outcome: Progressing Towards Goal  6/19/2019 1340 by Sita Hazel  Outcome: Progressing Towards Goal     Problem: Patient Education: Go to Patient Education Activity  Goal: Patient/Family Education  6/19/2019 1826 by Sita Hazel R  Outcome: Progressing Towards Goal  6/19/2019 1340 by Sita Hazel R  Outcome: Progressing Towards Goal     Problem: Patient Education: Go to Patient Education Activity  Goal: Patient/Family Education  6/19/2019 1826 by Sita Hazel R  Outcome: Progressing Towards Goal  6/19/2019 1340 by Sita Hazel  Outcome: Progressing Towards Goal     Problem: Patient Education: Go to Patient Education Activity  Goal: Patient/Family Education  6/19/2019 1826 by Sita Hazel  Outcome: Progressing Towards Goal  6/19/2019 1340 by Sita Hazel  Outcome: Progressing Towards Goal     Problem: TIA/CVA Stroke: 0-24 hours  Goal: Off Pathway (Use only if patient is Off Pathway)  6/19/2019 1826 by Sita Hazel  Outcome: Progressing Towards Goal  6/19/2019 1340 by Sita Hazel  Outcome: Progressing Towards Goal  Goal: Activity/Safety  6/19/2019 1826 by Sita Hazel R  Outcome: Progressing Towards Goal  6/19/2019 1340 by Siat Hazel  Outcome: Progressing Towards Goal  Goal: Consults, if ordered  6/19/2019 1826 by Sita Hazel R  Outcome: Progressing Towards Goal  6/19/2019 1340 by Sita Hazel  Outcome: Progressing Towards Goal  Goal: Diagnostic Test/Procedures  6/19/2019 1826 by Sita Hazel  Outcome: Progressing Towards Goal  6/19/2019 1340 by Sita Hazel  Outcome: Progressing Towards Goal  Goal: Nutrition/Diet  6/19/2019 1826 by Sita Hazel R  Outcome: Progressing Towards Goal  6/19/2019 1340 by Sita Alexandre R  Outcome: Progressing Towards Goal  Goal: Discharge Planning  6/19/2019 1826 by Sita Alexandre R  Outcome: Progressing Towards Goal  6/19/2019 1340 by Sita Alexandre  Outcome: Progressing Towards Goal  Goal: Medications  6/19/2019 1826 by Sita Alexandre R  Outcome: Progressing Towards Goal  6/19/2019 1340 by Sita Alexandre R  Outcome: Progressing Towards Goal  Goal: Respiratory  6/19/2019 1826 by Sita Alexandre R  Outcome: Progressing Towards Goal  6/19/2019 1340 by Sita Alexandre  Outcome: Progressing Towards Goal  Goal: Treatments/Interventions/Procedures  6/19/2019 1826 by Sita Alexandre  Outcome: Progressing Towards Goal  6/19/2019 1340 by Sita Alexandre  Outcome: Progressing Towards Goal  Goal: Minimize risk of bleeding post-thrombolytic infusion  6/19/2019 1826 by Sita Alexandre R  Outcome: Progressing Towards Goal  6/19/2019 1340 by Sita Alexandre  Outcome: Progressing Towards Goal  Goal: Monitor for complications post-thrombolytic infusion  6/19/2019 1826 by Sita Alexandre  Outcome: Progressing Towards Goal  6/19/2019 1340 by Sita Alexandre  Outcome: Progressing Towards Goal  Goal: Psychosocial  6/19/2019 1826 by Sita Alexandre R  Outcome: Progressing Towards Goal  6/19/2019 1340 by Sita Alexandre  Outcome: Progressing Towards Goal  Goal: *Hemodynamically stable  6/19/2019 1826 by Sita Alexandre R  Outcome: Progressing Towards Goal  6/19/2019 1340 by Sita Alexandre  Outcome: Progressing Towards Goal  Goal: *Neurologically stable  Description  Absence of additional neurological deficits    6/19/2019 1826 by Sita Alexandre  Outcome: Progressing Towards Goal  6/19/2019 1340 by Sita Alexandre  Outcome: Progressing Towards Goal  Goal: *Verbalizes anxiety and depression are reduced or absent  6/19/2019 1826 by Sita Alexandre  Outcome: Progressing Towards Goal  6/19/2019 1340 by Sita Bustamante  Outcome: Progressing Towards Goal  Goal: *Absence of Signs of Aspiration on Current Diet  6/19/2019 1826 by Sita Bustamante  Outcome: Progressing Towards Goal  6/19/2019 1340 by Sita Bustamante  Outcome: Progressing Towards Goal  Goal: *Absence of deep venous thrombosis signs and symptoms(Stroke Metric)  6/19/2019 1826 by Sita Bustamante  Outcome: Progressing Towards Goal  6/19/2019 1340 by Sita Bustamante  Outcome: Progressing Towards Goal  Goal: *Ability to perform ADLs and demonstrates progressive mobility and function  6/19/2019 1826 by Sita Bustamante  Outcome: Progressing Towards Goal  6/19/2019 1340 by Sita Bustamante  Outcome: Progressing Towards Goal  Goal: *Stroke education started(Stroke Metric)  6/19/2019 1826 by Sita Bustamante  Outcome: Progressing Towards Goal  6/19/2019 1340 by Sita Bustamante  Outcome: Progressing Towards Goal  Goal: *Dysphagia screen performed(Stroke Metric)  6/19/2019 1826 by Sita Bustamante  Outcome: Progressing Towards Goal  6/19/2019 1340 by Sita Bustamante  Outcome: Progressing Towards Goal  Goal: *Rehab consulted(Stroke Metric)  6/19/2019 1826 by Sita Bustamante  Outcome: Progressing Towards Goal  6/19/2019 1340 by Sita Bustamante  Outcome: Progressing Towards Goal     Problem: Patient Education: Go to Patient Education Activity  Goal: Patient/Family Education  Outcome: Progressing Towards Goal     Problem: Patient Education: Go to Patient Education Activity  Goal: Patient/Family Education  Outcome: Progressing Towards Goal

## 2019-06-19 NOTE — PROGRESS NOTES
Bedside shift change report given to Velvet Daniel RN (oncoming nurse) by Ernesto Del Angel RN (offgoing nurse). Report included the following information SBAR, Kardex, ED Summary, Intake/Output, MAR, Accordion and Recent Results.

## 2019-06-19 NOTE — ROUTINE PROCESS
Bedside and Verbal shift change report given to CARMEN Vazquez R.N. (oncoming nurse) by AMIE Guzman R.N. (offgoing nurse). Report included the following information SBAR, Kardex, Intake/Output, MAR, Accordion, Recent Results and Med Rec Status.

## 2019-06-19 NOTE — PROGRESS NOTES
Problem: Self Care Deficits Care Plan (Adult)  Goal: *Acute Goals and Plan of Care (Insert Text)  Description  Occupational Therapy Goals  Initiated 6/19/2019 within 7 day(s). 1.  Patient will perform grooming with independence   2. Patient will complete left UE coordination HEP with supervision to increase I with ADLs. 3.  Patient will complete standing with modified independence for 5 minutes during ADL to increase activity tolerance for functional activity. Outcome: Progressing Towards Goal     OCCUPATIONAL THERAPY EVALUATION    Patient: Haris Covarrubias (13 y.o. male)  Date: 6/19/2019  Primary Diagnosis: RODRIGUEZ (acute kidney injury) ([facility] [address] [N17.9]  Altered mental status [R41.82]        Precautions:  Fall    ASSESSMENT :  Based on the objective data described below, the patient presents with acute CVA. MRI shows scattered infarcts in frontal/parietal lobes. Pt completed bed mobility and transfers this date with supervision. Noted decreased coordination, proprioception with ataxia in left UE. Shld and  strength noted to bed 4-/5 in left UE compared to 4/5 in right UE. Pt provided with verbal instruction for exercises. Pt functioning at supervision level for ADLs. Pt could benefit from OT to increase I with functional activity and independence with left UE during activity. Patient will benefit from skilled intervention to address the above impairments. Patient?s rehabilitation potential is considered to be Excellent  Factors which may influence rehabilitation potential include:   ? None noted  ? Mental ability/status  ? Medical condition  ? Home/family situation and support systems  ? Safety awareness  ? Pain tolerance/management  ? Other:      PLAN :  Recommendations and Planned Interventions:  ?               Self Care Training                  ? Therapeutic Activities  ?                Functional Mobility Training    ? Cognitive Retraining  ? Therapeutic Exercises           ? Endurance Activities  ? Balance Training                   ? Neuromuscular Re-Education  ? Visual/Perceptual Training     ? Home Safety Training  ? Patient Education                 ? Family Training/Education  ? Other (comment):    Frequency/Duration: Patient will be followed by occupational therapy 1-2 times per day/4-7 days per week to address goals. Discharge Recommendations: Outpatient pending progress  Further Equipment Recommendations for Discharge: N/A     SUBJECTIVE:   Patient stated ? Its just my left hand. ?    OBJECTIVE DATA SUMMARY:     Past Medical History:   Diagnosis Date    DJD (degenerative joint disease)     Hypertension     Neuropathy    History reviewed. No pertinent surgical history. Barriers to Learning/Limitations: None  Compensate with: visual, verbal, tactile, kinesthetic cues/model  Prior Level of Function/Home Situation: I with ADLs prior to admission  Home Situation  Home Environment: Private residence  # Steps to Enter: 0  One/Two Story Residence: Two story  # of Interior Steps: 21  Interior Rails: Both  Living Alone: No(son living with pt)  Support Systems: Child(paula)  Patient Expects to be Discharged to[de-identified] Private residence  Current DME Used/Available at Home: Cane, straight, CPAP, Nebulizer(reports CPAP and neb at other house; indepent amb w/o AD)  Tub or Shower Type: Tub/Shower combination  ? Right hand dominant   ? Left hand dominant  Cognitive/Behavioral Status:  Neurologic State: Alert  Orientation Level: Oriented X4  Cognition: Appropriate decision making; Follows commands  Safety/Judgement: Insight into deficits  Skin: intact  Edema: none noted  Vision/Perceptual:    Corrective Lenses: Glasses(reports being far sighted; glasses are at home)  Coordination:  Coordination: Generally decreased, functional  Fine Motor Skills-Upper: Left Impaired;Right Intact    Gross Motor Skills-Upper: Left Impaired;Right Intact  Balance:  Sitting: Intact  Standing: Intact  Strength:  Strength: Generally decreased, functional 4-/5  Tone & Sensation:  Tone: Normal  Sensation: Impaired    Range of Motion:  AROM: Generally decreased, functional  PROM: Within functional limits  Functional Mobility and Transfers for ADLs:  Bed Mobility:  Supine to Sit: Supervision  Scooting: Supervision  Transfers:  Sit to Stand: Supervision  ADL Assessment:  Upper Body Dressing: Supervision  Lower Body Dressing: Supervision  ADL Intervention:  Cognitive Retraining  Safety/Judgement: Insight into deficits    Pain:  Pain Scale 1: Numeric (0 - 10)  Pain Intensity 1: 0    Activity Tolerance:   good  Please refer to the flowsheet for vital signs taken during this treatment. After treatment:   ? Patient left in no apparent distress sitting up in chair  ? Patient left in no apparent distress in bed  ? Call bell left within reach  ? Nursing notified  ? Caregiver present  ? Bed alarm activated    COMMUNICATION/EDUCATION:   ? Home safety education was provided and the patient/caregiver indicated understanding. ? Patient/family have participated as able in goal setting and plan of care. ? Patient/family agree to work toward stated goals and plan of care. ? Patient understands intent and goals of therapy, but is neutral about his/her participation. ? Patient is unable to participate in goal setting and plan of care.     Thank you for this referral.  Heaven Acosta, OTR/L  Time Calculation: 21 mins

## 2019-06-19 NOTE — PROGRESS NOTES
NIH scale completed on patient throughout night with score of 4, same as baseline. Patient stated that numbness to left hand was starting to go away. Patient slept with CPAP throughout night with no issues. No c/o pain. Bedside and Verbal shift change report given to RICHA Lowry RN (oncoming nurse) by Kendall Israel RN   (offgoing nurse). Report included the following information SBAR, Kardex, MAR and Recent Results.

## 2019-06-19 NOTE — PROGRESS NOTES
Problem: Falls - Risk of  Goal: *Absence of Falls  Description  Document Wally Kaiser Fall Risk and appropriate interventions in the flowsheet.   Outcome: Progressing Towards Goal     Problem: Patient Education: Go to Patient Education Activity  Goal: Patient/Family Education  Outcome: Progressing Towards Goal     Problem: Patient Education: Go to Patient Education Activity  Goal: Patient/Family Education  Outcome: Progressing Towards Goal     Problem: Patient Education: Go to Patient Education Activity  Goal: Patient/Family Education  Outcome: Progressing Towards Goal     Problem: TIA/CVA Stroke: 0-24 hours  Goal: Off Pathway (Use only if patient is Off Pathway)  Outcome: Progressing Towards Goal  Goal: Activity/Safety  Outcome: Progressing Towards Goal  Goal: Consults, if ordered  Outcome: Progressing Towards Goal  Goal: Diagnostic Test/Procedures  Outcome: Progressing Towards Goal  Goal: Nutrition/Diet  Outcome: Progressing Towards Goal  Goal: Discharge Planning  Outcome: Progressing Towards Goal  Goal: Medications  Outcome: Progressing Towards Goal  Goal: Respiratory  Outcome: Progressing Towards Goal  Goal: Treatments/Interventions/Procedures  Outcome: Progressing Towards Goal  Goal: Minimize risk of bleeding post-thrombolytic infusion  Outcome: Progressing Towards Goal  Goal: Monitor for complications post-thrombolytic infusion  Outcome: Progressing Towards Goal  Goal: Psychosocial  Outcome: Progressing Towards Goal  Goal: *Hemodynamically stable  Outcome: Progressing Towards Goal  Goal: *Neurologically stable  Description  Absence of additional neurological deficits    Outcome: Progressing Towards Goal  Goal: *Verbalizes anxiety and depression are reduced or absent  Outcome: Progressing Towards Goal  Goal: *Absence of Signs of Aspiration on Current Diet  Outcome: Progressing Towards Goal  Goal: *Absence of deep venous thrombosis signs and symptoms(Stroke Metric)  Outcome: Progressing Towards Goal  Goal: *Ability to perform ADLs and demonstrates progressive mobility and function  Outcome: Progressing Towards Goal  Goal: *Stroke education started(Stroke Metric)  Outcome: Progressing Towards Goal  Goal: *Dysphagia screen performed(Stroke Metric)  Outcome: Progressing Towards Goal  Goal: *Rehab consulted(Stroke Metric)  Outcome: Progressing Towards Goal

## 2019-06-19 NOTE — PROGRESS NOTES
Problem: Mobility Impaired (Adult and Pediatric)  Goal: *Acute Goals and Plan of Care (Insert Text)  Description  Physical Therapy Goals   Initiated 6/19/2019 and to be accomplished within 3 day(s)  1. Patient will move from supine <> sit with mod I in prep for out of bed activity and change of position. 2.  Patient will perform sit<> stand with mod I/LRAD in prep for transfers/ambulation. 3.  Patient will transfer from bed <> chair with mod I/LRAD for time up in chair for completion of ADL activity. 4.  Patient will ambulate 300 feet with mod I/LRAD for improved functional mobility/safe discharge. 5.  Patient will ascend/descend flight of stairs with handrail(s) with S for home navigation as needed. Outcome: Progressing Towards Goal     Problem: Mobility Impaired (Adult and Pediatric)  Goal: *Acute Goals and Plan of Care (Insert Text)  Description  Physical Therapy Goals   Initiated 6/19/2019 and to be accomplished within 3 day(s)  1. Patient will move from supine <> sit with mod I in prep for out of bed activity and change of position. 2.  Patient will perform sit<> stand with mod I/LRAD in prep for transfers/ambulation. 3.  Patient will transfer from bed <> chair with mod I/LRAD for time up in chair for completion of ADL activity. 4.  Patient will ambulate 300 feet with mod I/LRAD for improved functional mobility/safe discharge. 5.  Patient will ascend/descend flight of stairs with handrail(s) with S for home navigation as needed. Outcome: Progressing Towards Goal    PHYSICAL THERAPY EVALUATION    Patient: Holly Marr (40 y.o. male)  Date: 6/19/2019  Primary Diagnosis: RODRIGUEZ (acute kidney injury) ([facility] [address] [N17.9]  Altered mental status [R41.82]  Precautions:   ASSESSMENT :  Based on the objective data described below, the patient seen on telemetry unit. Pt found seated EOB on PT arrival and in NAD.   Reports independent mobility prior to current events and states adult son lives with pt.  Pt presents with L hemiparesis UE 4-/5, LE 4-/5 proximally and 4+/5 knee>foot. Pt with mild dysmetria/decreased sensationL UE. Pt demonstrates transfers with supervision, intact static/dynamic sitting balance and able to participate in GT while pushing IV pole with S/SBA 170ft. Teresa decreased, but otherwise gt without deviations. Verbal cues for safe management of IV pole initially. Pt returned to room and left seated EOB with all needs in reach, pt in NAD and pt sons entering room to visit. Pt does report recent h/o of several life events causing increased stress, including divorce, and stopping work per physician advisement due to BP elevation issues. Pt may benefit from HHPT vs OPPT follow up at discharge and information/therapeutic intervention for stress management. Pt Education: Role of physical therapy in acute care setting, fall prevention and safety/technique during functional mobility tasks      Patient will benefit from skilled intervention to address the above impairments. Patient?s rehabilitation potential is considered to be Good  Factors which may influence rehabilitation potential include:   ? None noted  ? Mental ability/status  ? Medical condition  ? Home/family situation and support systems  ? Safety awareness  ? Pain tolerance/management  ? Other:      PLAN :  Recommendations and Planned Interventions:  ?           Bed Mobility Training             ? Neuromuscular Re-Education  ? Transfer Training                   ? Orthotic/Prosthetic Training  ? Gait Training                          ? Modalities  ? Therapeutic Exercises          ? Edema Management/Control  ? Therapeutic Activities            ? Patient and Family Training/Education  ?            Other (comment):    Frequency/Duration: Patient will be followed by physical therapy 1-2 times per day to address goals.  Discharge Recommendations: Home Health vs OP PT  Further Equipment Recommendations for Discharge: N/A     SUBJECTIVE:   Patient stated ? I had a mini stroke. ?    OBJECTIVE DATA SUMMARY:     Past Medical History:   Diagnosis Date    DJD (degenerative joint disease)     Hypertension     Neuropathy    History reviewed. No pertinent surgical history. Barriers to Learning/Limitations: yes;  sensory deficits-vision/hearing/speech and physical  Compensate with: Visual Cues, Verbal Cues and Tactile Cues  Prior Level of Function/Home Situation: amb w/o AD PTA, independent in all functional mobility  Home Situation  Home Environment: Private residence  # Steps to Enter: 0  One/Two Story Residence: Two story  # of Interior Steps: 21  Interior Rails: Both  Living Alone: No(son living with pt)  Support Systems: Child(paula)  Patient Expects to be Discharged to[de-identified] Private residence  Current DME Used/Available at Home: Cane, straight, CPAP, Nebulizer(reports CPAP and neb at other house; indepent amb w/o AD)  Tub or Shower Type: Tub/Shower combination  Critical Behavior:  Neurologic State: Alert  Orientation Level: Oriented X4  Cognition: Appropriate decision making; Appropriate for age attention/concentration; Appropriate safety awareness; Follows commands  Safety/Judgement: Awareness of environment; Fall prevention  Psychosocial  Patient Behaviors: Calm; Cooperative  Purposeful Interaction: Yes  Pt Identified Daily Priority: Clinical issues (comment)  Caritas Process: Nurture loving kindness;Establish trust;Teaching/learning; Attend basic human needs  Caring Interventions: Reassure; Therapeutic modalities  Reassure: Therapeutic listening; Informing;Caring rounds  Therapeutic Modalities: Humor; Intentional therapeutic touch  Strength:    Strength: Generally decreased, functional(L UE 4-/5)  Tone & Sensation:   Tone: Normal(L UE)  Coordination: Generally decreased, functional: L UE mild dysmetria on FTN and SANTOS  Sensation: Impaired  Range Of Motion:  AROM: Generally decreased, functional(LUE)  PROM: Within functional limits LUE  Functional Mobility:  Bed Mobility: not tested; pt seated EOB on PT arrival  Transfers:  Sit to Stand: Supervision  Stand to Sit: Supervision; Other (comment)(vc for safety to be close to chair)  Balance:   Sitting: Intact  Standing: Intact  Ambulation/Gait Training:  Distance (ft): 170 Feet (ft)  Assistive Device: Gait belt(pt pushing IV pole)  Ambulation - Level of Assistance: Supervision;Stand-by assistance  Gait Description (WDL): Exceptions to WDL  Speed/Teresa: Pace decreased (<100 feet/min)  Interventions: Safety awareness training  Pain:  Pain Scale 1: Numeric (0 - 10)  Pain Intensity 1: 0  Activity Tolerance:   Good   Please refer to the flowsheet for vital signs taken during this treatment. After treatment:   ?         Patient left in no apparent distress sitting up EOB  ? Patient left in no apparent distress in bed  ? Call bell left within reach  ? Nursing notified  ? Caregiver present  ? Bed alarm activated    COMMUNICATION/EDUCATION:   ?         Fall prevention education was provided and the patient/caregiver indicated understanding. ? Patient/family have participated as able in goal setting and plan of care. ?         Patient/family agree to work toward stated goals and plan of care. ?         Patient understands intent and goals of therapy, but is neutral about his/her participation. ? Patient is unable to participate in goal setting and plan of care.     Eval Complexity: History: HIGH Complexity :3+ comorbidities / personal factors will impact the outcome/ POC Exam:HIGH Complexity : 4+ Standardized tests and measures addressing body structure, function, activity limitation and / or participation in recreation  Presentation: MEDIUM Complexity : Evolving with changing characteristics  Clinical Decision Making:High Complexity recent CVA with L hemiparesis UE involved more than LE   Overall Complexity:HIGH     Thank you for this referral.  Harrison Joseph, PT   Time Calculation: 21 mins

## 2019-06-20 LAB
ANION GAP SERPL CALC-SCNC: 7 MMOL/L (ref 3–18)
BACTERIA SPEC CULT: NORMAL
BUN SERPL-MCNC: 14 MG/DL (ref 7–18)
BUN/CREAT SERPL: 11 (ref 12–20)
CALCIUM SERPL-MCNC: 9.1 MG/DL (ref 8.5–10.1)
CHLORIDE SERPL-SCNC: 105 MMOL/L (ref 100–108)
CO2 SERPL-SCNC: 27 MMOL/L (ref 21–32)
CREAT SERPL-MCNC: 1.28 MG/DL (ref 0.6–1.3)
ERYTHROCYTE [DISTWIDTH] IN BLOOD BY AUTOMATED COUNT: 14.7 % (ref 11.6–14.5)
GLUCOSE SERPL-MCNC: 87 MG/DL (ref 74–99)
HCT VFR BLD AUTO: 39.6 % (ref 36–48)
HGB BLD-MCNC: 12.9 G/DL (ref 13–16)
MCH RBC QN AUTO: 28.7 PG (ref 24–34)
MCHC RBC AUTO-ENTMCNC: 32.6 G/DL (ref 31–37)
MCV RBC AUTO: 88.2 FL (ref 74–97)
PLATELET # BLD AUTO: 233 K/UL (ref 135–420)
PMV BLD AUTO: 9.9 FL (ref 9.2–11.8)
POTASSIUM SERPL-SCNC: 4.1 MMOL/L (ref 3.5–5.5)
RBC # BLD AUTO: 4.49 M/UL (ref 4.7–5.5)
SERVICE CMNT-IMP: NORMAL
SODIUM SERPL-SCNC: 139 MMOL/L (ref 136–145)
WBC # BLD AUTO: 7.6 K/UL (ref 4.6–13.2)

## 2019-06-20 PROCEDURE — 74011000250 HC RX REV CODE- 250: Performed by: HOSPITALIST

## 2019-06-20 PROCEDURE — 74011250637 HC RX REV CODE- 250/637: Performed by: HOSPITALIST

## 2019-06-20 PROCEDURE — 97530 THERAPEUTIC ACTIVITIES: CPT

## 2019-06-20 PROCEDURE — 36415 COLL VENOUS BLD VENIPUNCTURE: CPT

## 2019-06-20 PROCEDURE — 97116 GAIT TRAINING THERAPY: CPT

## 2019-06-20 PROCEDURE — 80048 BASIC METABOLIC PNL TOTAL CA: CPT

## 2019-06-20 PROCEDURE — 85027 COMPLETE CBC AUTOMATED: CPT

## 2019-06-20 PROCEDURE — 74011250637 HC RX REV CODE- 250/637: Performed by: PSYCHIATRY & NEUROLOGY

## 2019-06-20 PROCEDURE — 74011250636 HC RX REV CODE- 250/636: Performed by: HOSPITALIST

## 2019-06-20 PROCEDURE — 65660000000 HC RM CCU STEPDOWN

## 2019-06-20 PROCEDURE — 94660 CPAP INITIATION&MGMT: CPT

## 2019-06-20 PROCEDURE — 97535 SELF CARE MNGMENT TRAINING: CPT

## 2019-06-20 RX ORDER — PRIMIDONE 50 MG/1
50 TABLET ORAL 3 TIMES DAILY
Status: DISCONTINUED | OUTPATIENT
Start: 2019-06-20 | End: 2019-06-21 | Stop reason: HOSPADM

## 2019-06-20 RX ORDER — PRAVASTATIN SODIUM 20 MG/1
80 TABLET ORAL
Status: DISCONTINUED | OUTPATIENT
Start: 2019-06-20 | End: 2019-06-20

## 2019-06-20 RX ORDER — CHOLECALCIFEROL (VITAMIN D3) 125 MCG
5 CAPSULE ORAL
Status: DISCONTINUED | OUTPATIENT
Start: 2019-06-20 | End: 2019-06-21 | Stop reason: HOSPADM

## 2019-06-20 RX ORDER — PRAVASTATIN SODIUM 20 MG/1
40 TABLET ORAL
Status: DISCONTINUED | OUTPATIENT
Start: 2019-06-20 | End: 2019-06-20

## 2019-06-20 RX ORDER — CALCIUM CARBONATE 200(500)MG
200 TABLET,CHEWABLE ORAL AS NEEDED
Status: DISCONTINUED | OUTPATIENT
Start: 2019-06-20 | End: 2019-06-21 | Stop reason: HOSPADM

## 2019-06-20 RX ORDER — DOCUSATE SODIUM 100 MG/1
100 CAPSULE, LIQUID FILLED ORAL 2 TIMES DAILY
Status: DISCONTINUED | OUTPATIENT
Start: 2019-06-20 | End: 2019-06-21 | Stop reason: HOSPADM

## 2019-06-20 RX ORDER — FLUTICASONE FUROATE AND VILANTEROL 100; 25 UG/1; UG/1
1 POWDER RESPIRATORY (INHALATION) DAILY
Status: DISCONTINUED | OUTPATIENT
Start: 2019-06-21 | End: 2019-06-21 | Stop reason: HOSPADM

## 2019-06-20 RX ORDER — OMEPRAZOLE 20 MG/1
20 CAPSULE, DELAYED RELEASE ORAL DAILY
Status: DISCONTINUED | OUTPATIENT
Start: 2019-06-20 | End: 2019-06-21 | Stop reason: HOSPADM

## 2019-06-20 RX ORDER — MELATONIN
1000 DAILY
Status: DISCONTINUED | OUTPATIENT
Start: 2019-06-20 | End: 2019-06-21 | Stop reason: HOSPADM

## 2019-06-20 RX ORDER — DULOXETIN HYDROCHLORIDE 30 MG/1
30 CAPSULE, DELAYED RELEASE ORAL 3 TIMES DAILY
Status: DISCONTINUED | OUTPATIENT
Start: 2019-06-20 | End: 2019-06-21 | Stop reason: HOSPADM

## 2019-06-20 RX ORDER — PRAVASTATIN SODIUM 20 MG/1
40 TABLET ORAL
Status: DISCONTINUED | OUTPATIENT
Start: 2019-06-20 | End: 2019-06-21 | Stop reason: HOSPADM

## 2019-06-20 RX ORDER — DOXEPIN HYDROCHLORIDE 50 MG/1
50 CAPSULE ORAL
Status: DISCONTINUED | OUTPATIENT
Start: 2019-06-20 | End: 2019-06-21 | Stop reason: HOSPADM

## 2019-06-20 RX ADMIN — DOCUSATE SODIUM 100 MG: 100 CAPSULE, LIQUID FILLED ORAL at 12:05

## 2019-06-20 RX ADMIN — HEPARIN SODIUM 5000 UNITS: 5000 INJECTION INTRAVENOUS; SUBCUTANEOUS at 04:19

## 2019-06-20 RX ADMIN — DULOXETINE 30 MG: 30 CAPSULE, DELAYED RELEASE ORAL at 16:42

## 2019-06-20 RX ADMIN — CLOPIDOGREL BISULFATE 75 MG: 75 TABLET, FILM COATED ORAL at 09:21

## 2019-06-20 RX ADMIN — DOXEPIN HYDROCHLORIDE 50 MG: 50 CAPSULE ORAL at 21:52

## 2019-06-20 RX ADMIN — Medication 10 ML: at 21:54

## 2019-06-20 RX ADMIN — OMEPRAZOLE 20 MG: 20 CAPSULE, DELAYED RELEASE ORAL at 12:05

## 2019-06-20 RX ADMIN — DULOXETINE 30 MG: 30 CAPSULE, DELAYED RELEASE ORAL at 21:52

## 2019-06-20 RX ADMIN — PRIMIDONE 50 MG: 50 TABLET ORAL at 21:52

## 2019-06-20 RX ADMIN — VITAMIN D, TAB 1000IU (100/BT) 1000 UNITS: 25 TAB at 12:05

## 2019-06-20 RX ADMIN — Medication 10 ML: at 01:44

## 2019-06-20 RX ADMIN — FOLIC ACID: 5 INJECTION, SOLUTION INTRAMUSCULAR; INTRAVENOUS; SUBCUTANEOUS at 09:23

## 2019-06-20 RX ADMIN — PRAVASTATIN SODIUM 40 MG: 20 TABLET ORAL at 21:52

## 2019-06-20 RX ADMIN — Medication 10 ML: at 06:39

## 2019-06-20 RX ADMIN — PRIMIDONE 50 MG: 50 TABLET ORAL at 16:42

## 2019-06-20 RX ADMIN — DOCUSATE SODIUM 100 MG: 100 CAPSULE, LIQUID FILLED ORAL at 21:52

## 2019-06-20 RX ADMIN — Medication 10 ML: at 14:32

## 2019-06-20 RX ADMIN — HEPARIN SODIUM 5000 UNITS: 5000 INJECTION INTRAVENOUS; SUBCUTANEOUS at 18:43

## 2019-06-20 RX ADMIN — Medication 5 MG: at 21:52

## 2019-06-20 RX ADMIN — HEPARIN SODIUM 5000 UNITS: 5000 INJECTION INTRAVENOUS; SUBCUTANEOUS at 12:05

## 2019-06-20 NOTE — PROGRESS NOTES
Problem: Mobility Impaired (Adult and Pediatric)  Goal: *Acute Goals and Plan of Care (Insert Text)  Description  Physical Therapy Goals   Initiated 6/19/2019 and to be accomplished within 3 day(s)  1. Patient will move from supine <> sit with mod I in prep for out of bed activity and change of position. 2.  Patient will perform sit<> stand with mod I/LRAD in prep for transfers/ambulation. 3.  Patient will transfer from bed <> chair with mod I/LRAD for time up in chair for completion of ADL activity. 4.  Patient will ambulate 300 feet with mod I/LRAD for improved functional mobility/safe discharge. 5.  Patient will ascend/descend flight of stairs with handrail(s) with S for home navigation as needed. Outcome: Resolved/Met   PHYSICAL THERAPY TREATMENT AND DISCHARGE    Patient: Lisbeth Dance (18 y.o. male)  Date: 6/20/2019  Diagnosis: RODRIGUEZ (acute kidney injury) ([facility] [address] [N17.9]  Altered mental status [P17.67] Toxic metabolic encephalopathy    Precautions: Fall  PLOF: independent, ambulatory without AD, working    ASSESSMENT:  No assistance needed for bed mobility or sit to stand. Ambulated without AD, steady reciprocal gt pattern, no LOB or path deviations. Negotiated a full flight of stairs with a reciprocal pattern, holding L hand rail only. Returned to supine in bed without assist.       PLAN:  Maximum therapeutic gains met at current level of care and patient will be discharged from physical therapy at this time. Rationale for discharge:  ?     Goals Achieved  ? Plateau Reached  ? Patient not participating in therapy  ? Other:  Discharge Recommendations:  Outpatient for hand strengthening vs none  Further Equipment Recommendations for Discharge:  N/A     SUBJECTIVE:   Patient stated ? I know I can't drive now.?    OBJECTIVE DATA SUMMARY:   Critical Behavior:  Neurologic State: Alert  Orientation Level: Oriented X4  Cognition: Appropriate decision making, Appropriate for age attention/concentration, Appropriate safety awareness, Follows commands  Safety/Judgement: Insight into deficits  Functional Mobility Training:  Bed Mobility:    Supine to Sit: Independent  Sit to Supine: Independent  Transfers:  Sit to Stand: Independent  Stand to Sit: Independent  Balance:  Sitting: Intact  Standing: Intact; Without support   Ambulation/Gait Training:  Distance (ft): 300 Feet (ft)  Assistive Device: Gait belt  Ambulation - Level of Assistance: Independent  Stairs:  Number of Stairs Trained: 11  Stairs - Level of Assistance: Supervision  Rail Use: Left     Pain:  Pain level pre-treatment: 0/10   Pain level post-treatment: 0/10   Pain Intervention(s): Medication (see MAR); Rest, Ice, Repositioning   Response to intervention: Nurse notified, See doc flow    Activity Tolerance:   Good  Please refer to the flowsheet for vital signs taken during this treatment. After treatment:   ? Patient left in no apparent distress sitting up in chair  ? Patient left in no apparent distress in bed  ? Call bell left within reach  ? Nursing notified  ? Caregiver present  ? Bed alarm activated  ? SCDs applied      COMMUNICATION/EDUCATION:   ?         Role of Physical Therapy in the acute care setting. ?         Fall prevention education was provided and the patient/caregiver indicated understanding. ? Patient/family have participated as able and agree with findings and recommendations. ?         Patient is unable to participate in plan of care at this time. ?          Other:        Santos Isaias, PTA   Time Calculation: 10 mins

## 2019-06-20 NOTE — PROGRESS NOTES
Transition of care; home when medically cleared  patient sleeping when cm julio cesar to to room x3  Patients family will drive him home when medically cleared. patient goes to [facility]. He declines [facility]. [facility]. He uses a cane, has cpap no other needs at this time  Care Management Interventions  PCP Verified by CM: Yes([facility])  Mode of Transport at Discharge:  Other (see comment)(FRIEND/FAMILY, LIKELY EX WIFE)  Transition of Care Consult (CM Consult): Discharge Planning  Current Support Network: Own Home  Confirm Follow Up Transport: Self(OR FAMILY)  Plan discussed with Pt/Family/Caregiver: Yes  Freedom of Choice Offered: Yes  Discharge Location  Discharge Placement: Home with family assistance

## 2019-06-20 NOTE — PROGRESS NOTES
Hospitalist Progress Note    Patient: Marce Bill MRN: [id number]  CSN: [id number]    YOB: 1960  Age: 66 y.o. Sex: male    DOA: 6/18/2019 LOS:  LOS: 2 days          Chief Complaint:    Acute stroke      Assessment/Plan     Acute strokes-appear embolic, echo was normal, carotids and MRA head without occlusion-continue plavix, can stop IVF, start lowering BP tomorrow as per neurology-he was on pravachol at home, was not on original list-have updated meds and ordered pravachol at higher dose as states gets rsh with zocor    Left hand weakness due to acute CVA    LUISANA off his CPAP at home for 6 months    Acute encephalopathy-resolved    ARF-resolved    Neuropathy-can start to resume some of his home meds as tolerated    Depression-resume his cymbalta    Insomnia-resume melatonin    Possible UTI-cx is negative, stop antibiotics    Discussed with neurology and patient together  He is doing well  Stop IVF today  Start BP meds tomorrow    Expect d.c later tomorrow if doing well    Disposition :  Patient Active Problem List   Diagnosis Code    Altered mental status R41.82    RODRIGUEZ (acute kidney injury) ([facility] [address] N17.9    Toxic metabolic encephalopathy H49    Recurrent major depressive disorder ([facility] [address] F33.9    Acute CVA (cerebrovascular accident) ([facility] [address] I63.9       Subjective:  \"I feel ok\"  Still left hand is a bit weak  No new symptoms  No headaches      Review of systems:    Constitutional: denies  myalgias  Respiratory: denies SOB, cough  Cardiovascular: denies chest pain, palpitations  Gastrointestinal: denies nausea, vomiting, diarrhea      Vital signs/Intake and Output:  Visit Vitals  BP (!) 162/104   Pulse 86   Temp 97.8 °F (36.6 °C)   Resp 18   Ht 5' 8\" (1.727 m)   Wt 100.2 kg (220 lb 14.4 oz)   SpO2 100%   BMI 33.59 kg/m²     Current Shift:  No intake/output data recorded.   Last three shifts:  06/18 1901 - 06/20 0700  In: 2548.3 [P.O.:1440; I.V.:1108.3]  Out: 500 [Urine:500]    Exam:    General: Well developed, alert, NAD, OX3  Head/Neck: NCAT, supple, No masses, No lymphadenopathy  CVS:Regular rate and rhythm, no M/R/G, S1/S2 heard, no thrill  Lungs:Clear to auscultation bilaterally, no wheezes, rhonchi, or rales  Abdomen: Soft, Nontender, No distention, Normal Bowel sounds, No hepatomegaly  Extremities: No C/C/E, pulses palpable 2+  Neuro:left hand 4/5 strength, no other focal deficits, LE strength normal, CN grossly intact, speech normal  Psych:appropriate                Labs: Results:       Chemistry Recent Labs     06/20/19 0435 06/19/19  0245 06/18/19  1843 06/18/19  0450   GLU 87 89 115* 142*    142 140 136   K 4.1 4.4 4.3 4.6    111* 108 104   CO2 27 24 23 25   BUN 14 22* 26* 30*   CREA 1.28 1.47* 1.94* 2.82*   CA 9.1 8.1* 8.4* 9.0   AGAP 7 7 9 7   BUCR 11* 15 13 11*   AP  --   --   --  92   TP  --   --   --  7.8   ALB  --   --   --  3.4   GLOB  --   --   --  4.4*   AGRAT  --   --   --  0.8      CBC w/Diff Recent Labs     06/20/19 0435 06/19/19  0245 06/18/19  0450   WBC 7.6 9.3 12.3   RBC 4.49* 4.19* 4.79   HGB 12.9* 12.0* 14.0   HCT 39.6 37.2 42.5    214 220   GRANS  --   --  78*   LYMPH  --   --  12*   EOS  --   --  0      Cardiac Enzymes No results for input(s): CPK, CKND1, MITCHELL in the last 72 hours. No lab exists for component: CKRMB, TROIP   Coagulation No results for input(s): PTP, INR, APTT in the last 72 hours. No lab exists for component: INREXT    Lipid Panel Lab Results   Component Value Date/Time    Cholesterol, total 212 (H) 06/19/2019 02:45 AM    HDL Cholesterol 42 06/19/2019 02:45 AM    LDL, calculated 135.8 (H) 06/19/2019 02:45 AM    VLDL, calculated 34.2 06/19/2019 02:45 AM    Triglyceride 171 (H) 06/19/2019 02:45 AM    CHOL/HDL Ratio 5.0 06/19/2019 02:45 AM      BNP No results for input(s): BNPP in the last 72 hours.    Liver Enzymes Recent Labs     06/18/19  0450   TP 7.8   ALB 3.4   AP 92   SGOT 31      Thyroid Studies Lab Results   Component Value Date/Time    TSH 0.38 06/19/2019 02:45 AM        Procedures/imaging: see electronic medical records for all procedures/Xrays and details which were not copied into this note but were reviewed prior to creation of Augustus Patel MD

## 2019-06-20 NOTE — PROGRESS NOTES
Problem: Self Care Deficits Care Plan (Adult)  Goal: *Acute Goals and Plan of Care (Insert Text)  Description  Occupational Therapy Goals  Initiated 6/19/2019 within 7 day(s). 1.  Patient will perform grooming with independence   2. Patient will complete left UE coordination HEP with supervision to increase I with ADLs. 3.  Patient will complete standing with modified independence for 5 minutes during ADL to increase activity tolerance for functional activity. Outcome: Progressing Towards Goal     OCCUPATIONAL THERAPY TREATMENT    Patient: Lucy Rosas (54 y.o. male)  Date: 6/20/2019  Diagnosis: RODRIGUEZ (acute kidney injury) ([facility] [address] [N17.9]  Altered mental status [Z77.45] Toxic metabolic encephalopathy       Precautions: (?vision; sensation)  PLOF: Pt was independent at baseline; pt just retired as a , but still enjoys cooking    Chart, occupational therapy assessment, plan of care, and goals were reviewed. ASSESSMENT:  Emphasis on treatment towards increased neuroplasticity of LUE//non-dominant UE. Pt self-feeding and encouraged to use LUE for rest of meal. Noted discoordination of LUE and with increased repetition of use of spoon, able to perform more independently w/ less spillage. Pt instructed on foam strengthener for  and pinch strengthening. Also provided [facility] task to remove \"fish shaped\" macaroni from bag of shell macaroni. Encourage to perform all ADLs w/ LUE to increase functional use of L hand. Would recommend possibly 1 session of OT home health if possible and transition to outpatient. (Review of Stroke Book w/ Book provided). Progression toward goals:  ?          Improving appropriately and progressing toward goals  ? Improving slowly and progressing toward goals  ? Not making progress toward goals and plan of care will be adjusted     PLAN:  Patient continues to benefit from skilled intervention to address the above impairments.   Continue treatment per established plan of care. Discharge Recommendations:  Home Health and Outpatient Occupational Therapy  Further Equipment Recommendations for Discharge:  N/A     SUBJECTIVE:   Patient stated ? It's still a bit off.? (re: L hand)    OBJECTIVE DATA SUMMARY:   Cognitive/Behavioral Status:  Neurologic State: Alert  Orientation Level: Oriented X4  Cognition: Appropriate decision making, Appropriate for age attention/concentration, Appropriate safety awareness, Follows commands  Safety/Judgement: Awareness of environment, Fall prevention, Insight into deficits    Functional Mobility and Transfers for ADLs:   Bed Mobility:  Supine to Sit: Independent  Sit to Supine: Independent   Transfers:  Sit to Stand: Independent  Bed to Chair: Independent   Toilet Transfer : Independent   Tub Transfer: Independent   Bathroom Mobility: Independent    Balance:  Sitting: Intact  Standing: Intact; Without support    ADL Intervention:  Feeding  Feeding Assistance: Contact guard assistance(using LUE)  Container Management: Standing-by assistance  Cutting Food: Stand-by assistance  Utensil Management: Stand-by assistance  Food to Mouth: Contact guard assistance  Drink to Mouth: Contact guard assistance    Grooming  Washing Hands: Modified independent    Cognitive Retraining  Problem Solving: Inductive reason; Identifying the task; Identifying the problem;General alternative solution;Deductive reason; Awareness of environment  Executive Functions: Executing cognitive plans  Organizing/Sequencing: Breaking task down;Prioritizing  Safety/Judgement: Awareness of environment; Fall prevention; Insight into deficits    Neuro Re-Education:  Visual assessment w/ tracking and pt w/ central nystagmus after tracking to each quadrants. L eye moves independent of R eye initially and increased time for both eyes to be coordinated.     UE Therapeutic Exercises:   L hand FMC/GMC and /pinch strengthening    Pain:  Pain level pre-treatment: 0/10   Pain level post-treatment: 0/10  Pain Intervention(s): Medication administered by RN (see MAR); Rest, Ice, Repositioning   Response to intervention: Nurse notified, See doc flow sheet    Activity Tolerance:    Fair+. Patient able to complete ADLs with intermittent rest breaks. Patient limited by L hand sensation, strength, coordination. Please refer to the flowsheet for vital signs taken during this treatment. After treatment:   ?  Patient left in no apparent distress sitting up in chair  ? Patient left in no apparent distress in bed  ? Call bell left within reach  ? Nursing notified  ? Caregiver present  ? Bed alarm activated    COMMUNICATION/EDUCATION:   ? Role of Occupational Therapy in the acute care setting  ? Home safety education was provided and the patient/caregiver indicated understanding. ? Patient/family have participated as able in working towards goals and plan of care. ? Patient/family agree to work toward stated goals and plan of care. ? Patient understands intent and goals of therapy, but is neutral about his/her participation. ? Patient is unable to participate in goal setting and plan of care.       Thank you for this referral.  Chan Beverly  Time Calculation: 26 mins

## 2019-06-20 NOTE — ROUTINE PROCESS
Bedside and Verbal shift change report given to [address] (oncoming nurse) by Shannon Arechiga RN (offgoing nurse). Report included the following information SBAR, Kardex, Procedure Summary, Intake/Output, MAR, Accordion and Recent Results.

## 2019-06-20 NOTE — PROGRESS NOTES
[facility] REFERRAL CONTACT NOTE  [id number] [facility]    Re: Leana Johnson    Follow up on [address]  for [address]. [facility]; unable to assist.    Thank you for the consult.     Victor M Edwards

## 2019-06-20 NOTE — PROGRESS NOTES
NEUROLOGY PROGRESS NOTE        Patient: Lisbeth Dance        Sex: male          DOA: 6/18/2019  YOB: 1960      Age:  66 y.o.         LOS: 2 days     Identification:  67 YO male presents with bilateral cerebellar and cerebrum stroke. SUBJECTIVE:   Patient feels better today, he has been walking to bathroom without difficulties. Stroke Work-up:  Mra Brain Wo Cont Result Date: 6/19/2019  IMPRESSION: MRA head within normal limits. No focal high grade stenosis or aneurysm.     Mri Brain Wo Cont Result Date: 6/19/2019  IMPRESSION: 1.  Multiple scattered foci of acute infarct involving the bilateral cerebral and cerebellar series. Most of the supratentorial foci are frontoparietal in location. No evidence of associated hemorrhage or mass effect. Clinical correlation for possible central source of emboli is recommended. 2. Moderate diffuse paranasal sinus disease. No definite air-fluid level to suggest acute sinusitis. Preliminary report provided by another radiologist, Dr. Addy Zelaya, 5:52 PM 6/18/2019, discussed with Dr. Jacinta Meyers.  Reeves Harada Date: 6/18/2019     IMPRESSION: 1. No acute intracranial abnormalities are identified. No CT evidence to suggest acute intracranial hematoma, cortical infarct, or mass effect/mass lesion. 2. Paranasal sinus inflammatory changes.      Carotid doppler 6/19/19  There is intimal thickening present in the CCA and ICA bilaterally. No significant stenosis in the external carotid arteries bilaterally. Antegrade flow in both vertebral arteries. Normal flow in both subclavian arteries. .     Echocardiogram:   · Left Ventricle: Mild concentric hypertrophy. Low normal systolic dysfunction. Estimated left ventricular ejection fraction is 51 - 55%. Mild (grade 1) left ventricular diastolic dysfunction E/E' ratio is 8.97. .  · Left Atrium: Mildly dilated left atrium. · Mitral Valve: Trace mitral valve regurgitation.   · Pulmonary Artery: There is no evidence of pulmonary hypertension. · Interatrial Septum: Agitated saline contrast study was performed and color flow Doppler was used. Lipid panel:         Lab Results   Component Value Date/Time     Cholesterol, total 212 (H) 06/19/2019 02:45 AM     HDL Cholesterol 42 06/19/2019 02:45 AM     LDL, calculated 135.8 (H) 06/19/2019 02:45 AM     VLDL, calculated 34.2 06/19/2019 02:45 AM     Triglyceride 171 (H) 06/19/2019 02:45 AM     CHOL/HDL Ratio 5.0 06/19/2019 02:45 AM      HbA1c:         Lab Results   Component Value Date/Time     Hemoglobin A1c 6.2 (H) 06/19/2019 02:45 AM       REVIEW OF SYSTEMS: Denies chest pain, abdominal pain, nausea or vomiting. No fever or chills. OBJECTIVE:      Visit Vitals  BP (!) 178/120 (BP 1 Location: Right arm, BP Patient Position: At rest)   Pulse 81   Temp 98.4 °F (36.9 °C)   Resp 18   Ht 5' 8\" (1.727 m)   Wt 100.2 kg (220 lb 14.4 oz)   SpO2 98%   BMI 33.59 kg/m²     Physical Exam:  GENERAL: Pleasant, in no apparent distress. HEENT: Moist mucous membranes, sclerae anicteric, scalp is atraumatic. CVS: Regular rate and rhythm, no murmurs or gallops. No carotid bruits. PULMONARY: Clear to auscultation bilaterally. No rales or rhonchi. No wheezing. EXTREMITIES: Normal range of motion at all sites. No deformities. ABDOMEN: Soft, nontender. SKIN: No rashes or ecchymoses. Warm and dry. NEUROLOGIC: Alert and oriented x3. Speech is fluent without any aphasia or dysarthria. Cranial nerves: Face is symmetric with symmetric smile. Facial sensation is intact. Extraocular movements are intact with no nystagmus. Visual fields are full to confrontation. PERRL. Tongue is midline. Palate elevates symmetrically. Hearing intact to speech. Sternocleidomastoid and trapezius strengths are full bilaterally. Motor: 5/5 LUE, 5/5 LLE, 5/5 RUE, and RLE. Sensory: Intact to pinprick and touch on all four. Normal vibratory sensation on toes bilaterally.   Coordination: dysmetria at left upper extremity. Deep tendon reflexes: 2+ at biceps, brachioradialis, patella and ankles bilaterally. Toes are down-going bilaterally. Gait assessment: deferred. Current Facility-Administered Medications   Medication Dose Route Frequency    clopidogrel (PLAVIX) tablet 75 mg  75 mg Oral DAILY    0.9% sodium chloride 1,000 mL with mvi, adult no. 4 with vit K 10 mL, thiamine 345 mg, folic acid 1 mg infusion   IntraVENous Q24H    heparin (porcine) injection 5,000 Units  5,000 Units SubCUTAneous Q8H    sodium chloride (NS) flush 5-40 mL  5-40 mL IntraVENous Q8H    sodium chloride (NS) flush 5-40 mL  5-40 mL IntraVENous PRN       Laboratory  Recent Results (from the past 24 hour(s))   ECHO ADULT COMPLETE    Collection Time: 06/19/19 10:00 AM   Result Value Ref Range    LA Volume 51.24 18 - 58 mL    Right Atrial Area 4C 17.05 cm2    Ao Root D 3.06 cm    AO ASC D 3.63 cm    AO ARCH D 3.10 cm    Aortic Valve Systolic Peak Velocity 359.40 cm/s    AoV VTI 28.27 cm    Aortic Valve Area by Continuity of Peak Velocity 2.7 cm2    Aortic Valve Area by Continuity of VTI 3.1 cm2    AoV PG 7.4 mmHg    LVIDd 4.55 4.2 - 5.9 cm    LVPWd 1.24 (A) 0.6 - 1.0 cm    LVIDs 3.39 cm    IVSd 1.24 (A) 0.6 - 1.0 cm    LV ED Vol A2C 71.1 mL    LV ES Vol A4C 37.7 mL    LV ES Vol BP 38.3 22 - 58 mL    LVOT d 2.12 cm    LVOT Peak Velocity 103.61 cm/s    LVOT Peak Gradient 4.3 mmHg    LVOT VTI 25.08 cm    LV E' Septal Velocity 7.00 cm/s    LV E' Lateral Velocity 10.00 cm/s    MVA (PHT) 3.7 cm2    MV A Jaret 82.69 cm/s    MV E Jaret 73.86 cm/s    MV E/A 0.89     RVIDd 3.27 cm    Aortic Valve Systolic Mean Gradient 4.1 mmHg    BP EF 48.7 (A) 55 - 100 %    LV Ejection Fraction MOD 4C 51 %    LV Ejection Fraction MOD 2C 45 %    LA Vol 4C 48.02 18 - 58 mL    LA Vol 2C 42.34 18 - 58 mL    LV Mass .2 (A) 88 - 224 g    LV Mass AL Index 116.0 49 - 115 g/m2    E/E' lateral 7.39     E/E' septal 10.55     TAPSV 2.3 cm/s    IVC proximal 1.20 cm    E/E' ratio (averaged) 8.97     LV ES Vol A2C 38.8 mL    LVES Vol Index BP 17.8 mL/m2    LV ED Vol A4C 76.4 mL    LVED Vol Index BP 34.6 mL/m2    Mitral Valve E Wave Deceleration Time 206.4 ms    Mitral Valve Pressure Half-time 59.9 ms    Left Atrium Major Axis 4.78 cm    Pulmonic Valve Max Velocity 88.94 cm/s    LV ED Vol BP 74.7 67 - 155 ml    LA Vol Index 23.76 16 - 28 ml/m2    LA Vol Index 19.63 16 - 28 ml/m2    LA Vol Index 22.26 16 - 28 ml/m2    LVED Vol Index A4C 35.4 mL/m2    LVED Vol Index A2C 33.0 mL/m2    LVES Vol Index A4C 17.5 mL/m2    LVES Vol Index A2C 18.0 mL/m2    HEMA/BSA Pk Jaret 1.2 cm2/m2    HEMA/BSA VTI 1.4 cm2/m2    PV peak gradient 3.2 mmHg   METABOLIC PANEL, BASIC    Collection Time: 06/20/19  4:35 AM   Result Value Ref Range    Sodium 139 136 - 145 mmol/L    Potassium 4.1 3.5 - 5.5 mmol/L    Chloride 105 100 - 108 mmol/L    CO2 27 21 - 32 mmol/L    Anion gap 7 3.0 - 18 mmol/L    Glucose 87 74 - 99 mg/dL    BUN 14 7.0 - 18 MG/DL    Creatinine 1.28 0.6 - 1.3 MG/DL    BUN/Creatinine ratio 11 (L) 12 - 20      GFR est AA >60 >60 ml/min/1.73m2    GFR est non-AA 58 (L) >60 ml/min/1.73m2    Calcium 9.1 8.5 - 10.1 MG/DL   CBC W/O DIFF    Collection Time: 06/20/19  4:35 AM   Result Value Ref Range    WBC 7.6 4.6 - 13.2 K/uL    RBC 4.49 (L) 4.70 - 5.50 M/uL    HGB 12.9 (L) 13.0 - 16.0 g/dL    HCT 39.6 36.0 - 48.0 %    MCV 88.2 74.0 - 97.0 FL    MCH 28.7 24.0 - 34.0 PG    MCHC 32.6 31.0 - 37.0 g/dL    RDW 14.7 (H) 11.6 - 14.5 %    PLATELET 104 036 - 211 K/uL    MPV 9.9 9.2 - 11.8 FL       Radiology:  Xr Abd (kub)    Result Date: 6/19/2019  EXAM: KUB INDICATION: Clearance for MRI. Patient complaining of incontinence of bowel. COMPARISON: None. _______________ FINDINGS: Gas pattern is normal. No opaque foreign body.  Calcifications in the medial upper legs, right greater than left, possibly myositis ossificans on the right, and/or phleboliths on the left. _______________     IMPRESSION: No opaque foreign body, cleared for MRI. Mra Brain Wo Cont    Result Date: 6/19/2019  EXAM: MRA HEAD INDICATION: Left hand weakness, altered mental status COMPARISON: No prior study TECHNIQUE:  MR angiography of the head was performed utilizing 3-D time of flight axial acquisitions with multiplanar reconstructions. _______________________ FINDINGS:  There is no evidence of aneurysm. There is no focal high-grade stenosis within the intracranial arteries. Mild narrowing is seen in the bilateral carotid siphons without high grade stenosis. The carotid terminus is unremarkable. No focal anterior cerebral artery stenosis is seen. Left A1 segment is dominant. An anterior communicating artery is present. The middle cerebral artery bifurcations are unremarkable. Left vertebral artery is mildly dominant. PICA origins are unremarkable. The basilar artery is unremarkable. Posterior cerebral arteries are unremarkable. _______________________     IMPRESSION: MRA head within normal limits. No focal high grade stenosis or aneurysm. Mri Brain Wo Cont    Result Date: 6/19/2019  EXAM: MRI BRAIN W/O CONTRAST INDICATION: Left hand weakness, altered mental status COMPARISON: No prior study TECHNIQUE: Multiplanar multi sequence MR imaging of the brain was performed utilizing axial T2, FLAIR, diffusion, T2 gradient echo, and multiplanar T1 pre contrast imaging. No gadolinium was administered due to specific clinician request. _______________________ FINDINGS: VENTRICLES/EXTRA-AXIAL SPACES: The ventricles and sulci are normal in their size and configuration. BRAIN PARENCHYMA: There are small patchy foci of abnormal restricted diffusion involving the bilateral cerebral hemispheres within the frontal and parietal lobes. These foci involve cortex and subcortical white matter. There are small foci of involvement within the precentral gyri bilaterally. Minimal involvement of the left postcentral gyrus is also seen.  Additionally, there are areas of restricted diffusion within the bilateral cerebellar hemispheres posteriorly. Additional focus of restricted diffusion is present along the left posterior occipital cortex. These areas do demonstrate T2/FLAIR hyperintensity. No associated hemorrhage or mass effect is seen. No evidence of intracranial mass effect, midline shift, or herniation. No susceptibility artifact to suggest intraparenchymal hemorrhage. VASCULATURE:  The major intracranial vascular flow voids are grossly normal. ORBITS: The visualized orbits are unremarkable. PARANASAL SINUSES: Moderate mucoperiosteal thickening is seen throughout the paranasal sinuses. No air-fluid levels are present. MASTOID AIR CELLS: Visualized mastoid air cells are clear. OSSEOUS STRUCTURES: Degenerative changes are seen in visualized upper cervical spine. OTHER: None.  ________________________     IMPRESSION: 1.  Multiple scattered foci of acute infarct involving the bilateral cerebral and cerebellar series. Most of the supratentorial foci are frontoparietal in location. No evidence of associated hemorrhage or mass effect. Clinical correlation for possible central source of emboli is recommended. 2. Moderate diffuse paranasal sinus disease. No definite air-fluid level to suggest acute sinusitis. Preliminary report provided by another radiologist, Dr. Rosario Head, 5:52 PM 6/18/2019, discussed with Dr. Robin Rios. Ct Head Wo Cont    Result Date: 6/18/2019  EXAM: CT head CLINICAL INDICATION/HISTORY: Altered level of consciousness, confusion. COMPARISON: None. TECHNIQUE: Axial CT imaging of the head  was obtained from skull base to vertex without intravenous contrast. Coronal and sagittal reconstructions were obtained. One or more dose reduction techniques were used on this CT: automated exposure control, adjustment of the mAs and/or kVp according to patient's size, and iterative reconstruction techniques.  The specific techniques utilized on this CT exam have been documented in the patient's electronic medical record. Digital imaging and communications and medicine (DICOM) format image data are available to nonaffiliated external healthcare facilities or entities on a secure, media free, reciprocally searchable basis with patient authorization for at least a 12 month period after this study. _______________ FINDINGS: BRAIN AND POSTERIOR FOSSA: The sulci, folia, ventricles and basal cisterns are within normal limits for the patient?s age. There is no intracranial hemorrhage, mass effect, or shift of midline structures. There are no areas of abnormal parenchymal attenuation. EXTRA-AXIAL SPACES AND MENINGES: There are no abnormal extra-axial fluid collections. CALVARIUM: No acute osseous abnormality. SINUSES: Paranasal sinus partial opacification and mucosal thickening. Mastoids clear. OTHER: None. _______________     IMPRESSION: 1. No acute intracranial abnormalities are identified. No CT evidence to suggest acute intracranial hematoma, cortical infarct, or mass effect/mass lesion. 2. Paranasal sinus inflammatory changes. Xr Chest Port    Result Date: 6/18/2019  EXAM: Portable chest HISTORY: Hypotensive. Unresponsive. COMPARISON: None. TECHNIQUE: Single portable view. _______________ FINDINGS: SUPPORT STRUCTURES: None HEART AND MEDIASTINUM: Mild cardiomegaly. Normal pulmonary vasculature. LUNGS AND PLEURAL SPACES: The lungs are clear. BONES AND SOFT TISSUES: Bony structures are normal. _______________     IMPRESSION: Mild cardiomegaly. No radiographic evidence of acute pulmonary process. ASSESSMENT/IMPRESSION:   59-year-old male with past medical history of hypertension, presents with acute encephalopathy and left-sided weakness. 1. Acute encephalopathy: resolved,metabolic encephalopathy secondary to acute kidney failure. 2. Acute ischemic stroke: scattered at bilateral cerebral and cerebellum, embolic etiology. Patient is out of tPA window.  He is not candidate for thrombectomy. 3. Acute kidney failure.     RECOMMENDATIONS:  1. Start Plavix 75 mg daily ( he is allergic to Aspirin) and Pravachol 40 mg daily. 2. PT/OT/ST. 3. Permissive hypertension, blood pressure less than 220/110. Tomorrow gradually decrease BP to less than 140/90 mmHg.        I will follow the patient. Please do not hesitate to return with any questions. Signed:   Derian Huang MD  6/20/2019  9:23 AM

## 2019-06-20 NOTE — PROGRESS NOTES
1925: Assumed patient care from [address] [place] [address]. Patient is alert and oriented to person, place, time and situation. Respiratory status is stable on one. Vital signs are stable. MEWS score is a one. Patient denies any pain, discomfort, nausea vomiting dizziness or anxiety. White board and fall card is updated. Bed is locked and in lowest position. Call bell, water and personal belongings are within reach. Patient has no questions, comments or concerns after bedside shift report. 0700: Patient had an uneventful shift. Respiratory status, vital signs and MEWS score remained stable. Patient was resting quietly with no signs of distress noted. Bed locked and in lowest position. Call bell water and personal belongings were within reach. Patient had no questions, comments or concerns after bedside shift report.  Bedside report given to [facility].

## 2019-06-21 VITALS
TEMPERATURE: 98 F | HEIGHT: 68 IN | BODY MASS INDEX: 33.08 KG/M2 | SYSTOLIC BLOOD PRESSURE: 132 MMHG | RESPIRATION RATE: 18 BRPM | DIASTOLIC BLOOD PRESSURE: 83 MMHG | HEART RATE: 88 BPM | OXYGEN SATURATION: 95 % | WEIGHT: 218.26 LBS

## 2019-06-21 LAB
ANION GAP SERPL CALC-SCNC: 5 MMOL/L (ref 3–18)
BUN SERPL-MCNC: 14 MG/DL (ref 7–18)
BUN/CREAT SERPL: 12 (ref 12–20)
CALCIUM SERPL-MCNC: 9.1 MG/DL (ref 8.5–10.1)
CHLORIDE SERPL-SCNC: 103 MMOL/L (ref 100–108)
CO2 SERPL-SCNC: 30 MMOL/L (ref 21–32)
CREAT SERPL-MCNC: 1.2 MG/DL (ref 0.6–1.3)
ERYTHROCYTE [DISTWIDTH] IN BLOOD BY AUTOMATED COUNT: 14.3 % (ref 11.6–14.5)
GLUCOSE SERPL-MCNC: 108 MG/DL (ref 74–99)
HCT VFR BLD AUTO: 39.1 % (ref 36–48)
HGB BLD-MCNC: 13 G/DL (ref 13–16)
MCH RBC QN AUTO: 28.8 PG (ref 24–34)
MCHC RBC AUTO-ENTMCNC: 33.2 G/DL (ref 31–37)
MCV RBC AUTO: 86.7 FL (ref 74–97)
PLATELET # BLD AUTO: 207 K/UL (ref 135–420)
PMV BLD AUTO: 8.9 FL (ref 9.2–11.8)
POTASSIUM SERPL-SCNC: 3.8 MMOL/L (ref 3.5–5.5)
RBC # BLD AUTO: 4.51 M/UL (ref 4.7–5.5)
SODIUM SERPL-SCNC: 138 MMOL/L (ref 136–145)
WBC # BLD AUTO: 6.9 K/UL (ref 4.6–13.2)

## 2019-06-21 PROCEDURE — 74011250637 HC RX REV CODE- 250/637: Performed by: PSYCHIATRY & NEUROLOGY

## 2019-06-21 PROCEDURE — 74011250637 HC RX REV CODE- 250/637: Performed by: HOSPITALIST

## 2019-06-21 PROCEDURE — 74011250636 HC RX REV CODE- 250/636: Performed by: HOSPITALIST

## 2019-06-21 PROCEDURE — 36415 COLL VENOUS BLD VENIPUNCTURE: CPT

## 2019-06-21 PROCEDURE — 80048 BASIC METABOLIC PNL TOTAL CA: CPT

## 2019-06-21 PROCEDURE — 94660 CPAP INITIATION&MGMT: CPT

## 2019-06-21 PROCEDURE — 85027 COMPLETE CBC AUTOMATED: CPT

## 2019-06-21 RX ORDER — CLOPIDOGREL BISULFATE 75 MG/1
75 TABLET ORAL DAILY
Qty: 30 TAB | Refills: 2 | Status: SHIPPED | OUTPATIENT
Start: 2019-06-22

## 2019-06-21 RX ORDER — PRAVASTATIN SODIUM 80 MG/1
80 TABLET ORAL
Qty: 30 TAB | Refills: 2 | Status: SHIPPED | OUTPATIENT
Start: 2019-06-21

## 2019-06-21 RX ORDER — GABAPENTIN 100 MG/1
200 CAPSULE ORAL 3 TIMES DAILY
Status: DISCONTINUED | OUTPATIENT
Start: 2019-06-21 | End: 2019-06-21 | Stop reason: HOSPADM

## 2019-06-21 RX ORDER — GABAPENTIN 100 MG/1
200 CAPSULE ORAL 4 TIMES DAILY
Qty: 120 CAP | Refills: 0 | Status: SHIPPED | OUTPATIENT
Start: 2019-06-21

## 2019-06-21 RX ORDER — LISINOPRIL 20 MG/1
20 TABLET ORAL DAILY
Status: DISCONTINUED | OUTPATIENT
Start: 2019-06-21 | End: 2019-06-21 | Stop reason: HOSPADM

## 2019-06-21 RX ADMIN — Medication 10 ML: at 07:44

## 2019-06-21 RX ADMIN — GABAPENTIN 200 MG: 100 CAPSULE ORAL at 09:06

## 2019-06-21 RX ADMIN — DOCUSATE SODIUM 100 MG: 100 CAPSULE, LIQUID FILLED ORAL at 09:06

## 2019-06-21 RX ADMIN — OMEPRAZOLE 20 MG: 20 CAPSULE, DELAYED RELEASE ORAL at 09:06

## 2019-06-21 RX ADMIN — LISINOPRIL 20 MG: 20 TABLET ORAL at 07:44

## 2019-06-21 RX ADMIN — CLOPIDOGREL BISULFATE 75 MG: 75 TABLET, FILM COATED ORAL at 09:06

## 2019-06-21 RX ADMIN — PRIMIDONE 50 MG: 50 TABLET ORAL at 09:06

## 2019-06-21 RX ADMIN — HEPARIN SODIUM 5000 UNITS: 5000 INJECTION INTRAVENOUS; SUBCUTANEOUS at 03:39

## 2019-06-21 RX ADMIN — VITAMIN D, TAB 1000IU (100/BT) 1000 UNITS: 25 TAB at 09:06

## 2019-06-21 RX ADMIN — DULOXETINE 30 MG: 30 CAPSULE, DELAYED RELEASE ORAL at 09:06

## 2019-06-21 NOTE — DISCHARGE SUMMARY
[facility] SUMMARY    Name:  Rodger Patten  MR#:   [id number]  :  1960  ACCOUNT #:  [de-identified]  ADMIT DATE:  2019  DISCHARGE DATE:  2019    DISCHARGE DIAGNOSES:  1. Acute stroke. 2.  Acute encephalopathy, resolved. 3.  Acute kidney injury, resolved. 4.  Neuropathy. 5.  Depression and anxiety. 6.  Hypertension. 7.  Hypercholesterolemia. 8.  Obstructive sleep apnea. HOSPITAL COURSE:  This is a 70-year-old [facility] gentleman who was found by his son to be altered and incontinent of stool on his couch at home. This has started the previous evening. He thought he was sleepy initially. Ultimately when he checked on him again, he found him having difficulty to get up and possibly some left-sided weakness. He was incontinent of stool on the couch, he called 911. They brought him to the emergency room where he was not able to give much history at that time. No neurologic deficits were noted. He was given fluids. When I saw him the emergency room, he was starting to wake up and said he had taken Seroquel and Neurontin together which are his usually prescribed medications. He states he has been trying to go to sleep the previous night as he suffers with chronic insomnia and had taken the medication together. There was evidence for UTI, acute kidney injury, dehydration. Thus he was admitted to telemetry, given fluids and antibiotics, and woke up over the next few hours with recheck that afternoon, he noted that his left hand still felt numb and weak. At that point, we got an MRI of the brain. There was evidence for acute embolic strokes compared to a CT scan of his brain that had been done earlier in the morning which was negative for any intracranial infarct or mass effect. The MRI was reviewed with Neurology who recommended aspirin; however, he states he is intolerant of aspirin, thus we gave Plavix.   He initially stated he could not take statins, but then subsequently indicated he was taking Pravachol at home which we resumed. The MRI showed multiple scattered foci of acute infarct involving the bilateral cerebral and cerebellar series. At that point, we also obtained a stat MRA of the brain which fortunately showed no focal high-grade stenosis or aneurysm. He had an echocardiogram on the 19th that showed an EF of 19-02%, grade 1 diastolic left ventricular dysfunction. No evidence for pulmonary hypertension. There was no evidence for effusion and no PFO was visualized in the left atrium. The patient's acute kidney injury resolved quickly with fluids. His creatinine has normalized. GFR is greater than 60, now electrolytes are within normal limits. His total cholesterol is 212, LDL was 135. I did increase his Pravachol from 20-80. His hemoglobin A1c is 6.2%. Blood sugars on his chemistries are between 89 and 115. Neurology has been following along since his admission. We continued on oral medications. IV fluids were stopped yesterday morning. We started back antihypertensive medication this morning as he was having permissive hypertension. He is improved today. His left hand feels better. He denies any further neurologic deficits. No headaches. No difficulty swallowing. No chest pain, shortness of breath. PHYSICAL EXAMINATION:  LUNGS:  His lungs are clear bilaterally. CARDIAC EXAM:  Regular rate and rhythm. No murmur,rub, or gallop. NEUROLOGIC:  Left hand has pretty good grasp strength, slightly weaker than the right side. Good upper extremity range of motion, otherwise no neurologic deficit in the lower extremities. Cranial nerves II-XII are grossly intact. VITAL SIGNS:  Blood pressure now 132/83, pulse 88, temp 98, respiratory rate is 18, SaO2 is 95%. He wore CPAP here. Unfortunately, he does not have a CPAP at home.   He is trying to obtain that from his ex-wife who apparently took it with her, hopefully they can obtain that shortly. He needs to follow up at the [facility] as soon as possible as an outpatient and case management is working on that follow-up appointment for him. His exam is as noted above. Vitals are as noted above. His metabolic issues are resolved. I have recommended diminishing his Neurontin dose for home, avoiding taking these medications together as he did prior. He needs to continue on Plavix, which I have written a new prescription for, Pravachol is up to 80 mg at night, Neurontin is down to 200 mg 4 times a day versus 400. I would recommend he stop taking the doxepin, the Norco, Motrin, Mobic, Viagra, and Desyrel for now. Follow up with the [facility] early next week. Continue home health as needed. Continue his following medications as prescribed prior Zyrtec, Antivert, Robaxin as needed, multivitamin, Seroquel 400 mg at night, Prilosec 20 mg daily, Symbicort 2 puffs twice a day, vitamin D 1000 units daily, Colace 100 mg twice daily, Cymbalta 30 mg 3 times daily, lisinopril 20 mg daily, melatonin 3 mg at night, and primidone 50 mg 3 times daily. He is improved and stable for discharge. Neurology has signed off. He is a full code status. He should be on a cardiac type diet at home, low sugar and low carbohydrates. Follow up as noted above. We will give him discharge instructions on an acute stroke, early diabetes, and high blood pressure. 45 minutes on discharge time.       Valery Polo MD      [id number]  D:  06/21/2019 12:37  T:  06/21/2019 12:48  JOB #:  [id number]

## 2019-06-21 NOTE — PROGRESS NOTES
Hospitalist Progress Note    Patient: Sj Blum MRN: [id number]  CSN: [id number]    YOB: 1960  Age: 66 y.o. Sex: male    DOA: 6/18/2019 LOS:  LOS: 3 days          Chief Complaint:      CVA    Assessment/Plan     Acute strokes-appear embolic, echo was normal, carotids and MRA head without occlusion-continue plavix and pravachol    Uncontrolled hypertension-start back his lisinopril today-try to lower BP gradually     Left hand weakness due to acute CVA     LUISANA off his CPAP at home for 6 months     Acute encephalopathy-resolved     ARF-resolved     Neuropathy-can start to resume some of his home meds as tolerated-start neurontin 200 mg tid     Depression-resumed his cymbalta     Insomnia-resumed melatonin     Possible UTI-cx is negative, stop antibiotics      Disposition :home if ok with neurology  Patient Active Problem List   Diagnosis Code    Altered mental status R41.82    RODRIGUEZ (acute kidney injury) ([facility] [address] N17.9    Toxic metabolic encephalopathy W04    Recurrent major depressive disorder (HCC) F33.9    Acute CVA (cerebrovascular accident) ([facility] [address] I63.9       Subjective:  No complaints  States left hand is a little stronger  No headaches or new concerns        Review of systems:    Constitutional: denies fevers, chills  Respiratory: denies SOB  Cardiovascular: denies chest pain  Gastrointestinal: denies nausea, vomiting      Vital signs/Intake and Output:  Visit Vitals  BP (!) 160/118 (BP 1 Location: Right arm, BP Patient Position: At rest)   Pulse 72   Temp 97.6 °F (36.4 °C)   Resp 18   Ht 5' 8\" (1.727 m)   Wt 99 kg (218 lb 4.1 oz)   SpO2 100%   BMI 33.19 kg/m²     Current Shift:  No intake/output data recorded.   Last three shifts:  06/19 0701 - 06/20 1900  In: 1560 [P.O.:1560]  Out: 0     Exam:    General: Well developed, alert, NAD, OX3  CVS:Regular rate and rhythm, no M/R/G, S1/S2 heard, no thrill  Lungs:Clear to auscultation bilaterally, no wheezes, rhonchi, or rales  Abdomen: Soft, Nontender, No distention, Normal Bowel sounds, No hepatomegaly  Extremities: No C/C/E, pulses palpable 2+  Neuro:Left hand 4.5/5 strength, good , slightly less than right  Psych:appropriate                Labs: Results:       Chemistry Recent Labs     06/21/19 0230 06/20/19 0435 06/19/19  0245   * 87 89    139 142   K 3.8 4.1 4.4    105 111*   CO2 30 27 24   BUN 14 14 22*   CREA 1.20 1.28 1.47*   CA 9.1 9.1 8.1*   AGAP 5 7 7   BUCR 12 11* 15      CBC w/Diff Recent Labs     06/21/19 0230 06/20/19 0435 06/19/19  0245   WBC 6.9 7.6 9.3   RBC 4.51* 4.49* 4.19*   HGB 13.0 12.9* 12.0*   HCT 39.1 39.6 37.2    233 214      Cardiac Enzymes No results for input(s): CPK, CKND1, MITCHELL in the last 72 hours. No lab exists for component: CKRMB, TROIP   Coagulation No results for input(s): PTP, INR, APTT in the last 72 hours. No lab exists for component: INREXT    Lipid Panel Lab Results   Component Value Date/Time    Cholesterol, total 212 (H) 06/19/2019 02:45 AM    HDL Cholesterol 42 06/19/2019 02:45 AM    LDL, calculated 135.8 (H) 06/19/2019 02:45 AM    VLDL, calculated 34.2 06/19/2019 02:45 AM    Triglyceride 171 (H) 06/19/2019 02:45 AM    CHOL/HDL Ratio 5.0 06/19/2019 02:45 AM      BNP No results for input(s): BNPP in the last 72 hours. Liver Enzymes No results for input(s): TP, ALB, TBIL, AP, SGOT, GPT in the last 72 hours.     No lab exists for component: DBIL   Thyroid Studies Lab Results   Component Value Date/Time    TSH 0.38 06/19/2019 02:45 AM        Procedures/imaging: see electronic medical records for all procedures/Xrays and details which were not copied into this note but were reviewed prior to creation of Gayle Calix MD

## 2019-06-21 NOTE — PROGRESS NOTES
Problem: Falls - Risk of  Goal: *Absence of Falls  Description  Document Chelsie Burden Fall Risk and appropriate interventions in the flowsheet.   Outcome: Progressing Towards Goal     Problem: Patient Education: Go to Patient Education Activity  Goal: Patient/Family Education  Outcome: Progressing Towards Goal     Problem: Patient Education: Go to Patient Education Activity  Goal: Patient/Family Education  Outcome: Progressing Towards Goal     Problem: TIA/CVA Stroke: 0-24 hours  Goal: Off Pathway (Use only if patient is Off Pathway)  Outcome: Progressing Towards Goal  Goal: Activity/Safety  Outcome: Progressing Towards Goal  Goal: Consults, if ordered  Outcome: Progressing Towards Goal  Goal: Diagnostic Test/Procedures  Outcome: Progressing Towards Goal  Goal: Nutrition/Diet  Outcome: Progressing Towards Goal  Goal: Discharge Planning  Outcome: Progressing Towards Goal  Goal: Medications  Outcome: Progressing Towards Goal  Goal: Respiratory  Outcome: Progressing Towards Goal  Goal: Treatments/Interventions/Procedures  Outcome: Progressing Towards Goal  Goal: Minimize risk of bleeding post-thrombolytic infusion  Outcome: Progressing Towards Goal  Goal: Monitor for complications post-thrombolytic infusion  Outcome: Progressing Towards Goal  Goal: Psychosocial  Outcome: Progressing Towards Goal  Goal: *Hemodynamically stable  Outcome: Progressing Towards Goal  Goal: *Neurologically stable  Description  Absence of additional neurological deficits    Outcome: Progressing Towards Goal  Goal: *Verbalizes anxiety and depression are reduced or absent  Outcome: Progressing Towards Goal  Goal: *Absence of Signs of Aspiration on Current Diet  Outcome: Progressing Towards Goal  Goal: *Absence of deep venous thrombosis signs and symptoms(Stroke Metric)  Outcome: Progressing Towards Goal  Goal: *Ability to perform ADLs and demonstrates progressive mobility and function  Outcome: Progressing Towards Goal  Goal: *Stroke education started(Stroke Metric)  Outcome: Progressing Towards Goal  Goal: *Dysphagia screen performed(Stroke Metric)  Outcome: Progressing Towards Goal     Problem: Patient Education: Go to Patient Education Activity  Goal: Patient/Family Education  Outcome: Progressing Towards Goal     Problem: Patient Education: Go to Patient Education Activity  Goal: Patient/Family Education  Outcome: Progressing Towards Goal     Problem: Patient Education: Go to Patient Education Activity  Goal: Patient/Family Education  Outcome: Progressing Towards Goal

## 2019-06-21 NOTE — PROGRESS NOTES
Transiton of care: anticipate d/c home today  Cm did have patient sign and [facility]. Patient lives with his son and he uses a canee. He has his own cpap. He has % service connected benefits. Patient states his son will pick him up when ready for d/c. [facility]          [address] [facility]        Bruce Easton MD          Next steps: Follow up      [address] [facility], [address] [place] [address] [address]    Physician's office will call patient at home to schedule a follow-up appointment. Patient denies otr needs from cm  Care Management Interventions  PCP Verified by CM: Yes([facility])  Mode of Transport at Discharge:  Other (see comment)(FRIEND/FAMILY, LIKELY EX WIFE)  Transition of Care Consult (CM Consult): Discharge Planning  Current Support Network: Own Home  Confirm Follow Up Transport: Self(OR FAMILY)  Plan discussed with Pt/Family/Caregiver: Yes  Freedom of Choice Offered: Yes  Discharge Location   Discharge Placement: Home with family assistance

## 2019-06-21 NOTE — PROGRESS NOTES
Pt placed on cpap with full face mask in place;  Resmed functioning with auto titration settings per pt demand

## 2019-06-21 NOTE — PROGRESS NOTES
1920 Received report from Fanny Huddleston. 2000 Assessment completed. NIH 3 due to ataxia, numbness and weakness in left arm.      2340 Notified Dr. Lina Ga of patient's blood pressure being 169/112. Patient will begin BP medication tomorrow. Will Continue to monitor. Patient also request TUMS. Order for [facility] received. 1900 Bedside shift change report given to Minna Myers RN (oncoming nurse) by Antonio Bishop. Steve Young RN (offgoing nurse). Report included the following information SBAR and Kardex.
